# Patient Record
Sex: MALE | Race: BLACK OR AFRICAN AMERICAN | NOT HISPANIC OR LATINO
[De-identification: names, ages, dates, MRNs, and addresses within clinical notes are randomized per-mention and may not be internally consistent; named-entity substitution may affect disease eponyms.]

---

## 2018-03-09 ENCOUNTER — APPOINTMENT (OUTPATIENT)
Dept: OTOLARYNGOLOGY | Facility: CLINIC | Age: 63
End: 2018-03-09
Payer: COMMERCIAL

## 2018-03-09 DIAGNOSIS — Z78.9 OTHER SPECIFIED HEALTH STATUS: ICD-10-CM

## 2018-03-09 PROCEDURE — 99202 OFFICE O/P NEW SF 15 MIN: CPT

## 2018-03-11 PROBLEM — Z78.9 SOCIAL ALCOHOL USE: Status: ACTIVE | Noted: 2018-03-11

## 2018-03-11 RX ORDER — TIOTROPIUM BROMIDE 18 UG/1
18 CAPSULE ORAL; RESPIRATORY (INHALATION)
Refills: 0 | Status: ACTIVE | COMMUNITY
Start: 2018-03-11

## 2018-03-11 RX ORDER — ASPIRIN 81 MG/1
81 TABLET ORAL
Refills: 0 | Status: ACTIVE | COMMUNITY
Start: 2018-03-11

## 2018-03-11 RX ORDER — ALBUTEROL SULFATE 2.5 MG/3ML
(2.5 MG/3ML) SOLUTION RESPIRATORY (INHALATION)
Refills: 0 | Status: ACTIVE | COMMUNITY
Start: 2018-03-11

## 2018-03-14 ENCOUNTER — FORM ENCOUNTER (OUTPATIENT)
Age: 63
End: 2018-03-14

## 2018-03-15 ENCOUNTER — OUTPATIENT (OUTPATIENT)
Dept: OUTPATIENT SERVICES | Facility: HOSPITAL | Age: 63
LOS: 1 days | End: 2018-03-15
Payer: COMMERCIAL

## 2018-03-15 ENCOUNTER — APPOINTMENT (OUTPATIENT)
Dept: CT IMAGING | Facility: HOSPITAL | Age: 63
End: 2018-03-15
Payer: COMMERCIAL

## 2018-03-15 PROCEDURE — 70487 CT MAXILLOFACIAL W/DYE: CPT | Mod: 26

## 2018-03-15 PROCEDURE — 70487 CT MAXILLOFACIAL W/DYE: CPT

## 2018-03-16 ENCOUNTER — APPOINTMENT (OUTPATIENT)
Dept: OTOLARYNGOLOGY | Facility: CLINIC | Age: 63
End: 2018-03-16
Payer: MEDICAID

## 2018-03-16 PROCEDURE — 99213 OFFICE O/P EST LOW 20 MIN: CPT

## 2018-03-16 RX ORDER — IPRATROPIUM BROMIDE AND ALBUTEROL SULFATE 2.5; .5 MG/3ML; MG/3ML
0.5-2.5 (3) SOLUTION RESPIRATORY (INHALATION)
Refills: 0 | Status: ACTIVE | COMMUNITY

## 2018-03-16 RX ORDER — FLUTICASONE PROPIONATE AND SALMETEROL XINAFOATE 115; 21 UG/1; UG/1
115-21 AEROSOL, METERED RESPIRATORY (INHALATION)
Refills: 0 | Status: ACTIVE | COMMUNITY

## 2018-03-16 RX ORDER — TRIAMCINOLONE ACETONIDE 55 UG/1
55 SPRAY, METERED NASAL
Qty: 1 | Refills: 12 | Status: ACTIVE | COMMUNITY
Start: 2018-03-16 | End: 1900-01-01

## 2018-03-16 RX ORDER — MONTELUKAST 10 MG/1
10 TABLET, FILM COATED ORAL
Refills: 0 | Status: ACTIVE | COMMUNITY

## 2018-03-16 RX ORDER — LORATADINE AND PSEUDOEPHEDRINE SULFATE 10; 240 MG/1; MG/1
10-240 TABLET, FILM COATED, EXTENDED RELEASE ORAL
Refills: 0 | Status: ACTIVE | COMMUNITY

## 2018-03-16 RX ORDER — FLUTICASONE PROPIONATE 50 UG/1
50 SPRAY, METERED NASAL
Refills: 0 | Status: ACTIVE | COMMUNITY

## 2018-03-16 RX ORDER — FEXOFENADINE HYDROCHLORIDE 180 MG/1
180 TABLET ORAL
Refills: 0 | Status: ACTIVE | COMMUNITY

## 2018-03-16 RX ORDER — PANTOPRAZOLE 40 MG/1
40 TABLET, DELAYED RELEASE ORAL
Refills: 0 | Status: ACTIVE | COMMUNITY

## 2018-03-16 RX ORDER — METHYLPREDNISOLONE 4 MG/1
4 TABLET ORAL
Qty: 1 | Refills: 0 | Status: ACTIVE | COMMUNITY
Start: 2018-03-16 | End: 1900-01-01

## 2018-04-02 ENCOUNTER — APPOINTMENT (OUTPATIENT)
Dept: CT IMAGING | Facility: HOSPITAL | Age: 63
End: 2018-04-02

## 2018-04-09 ENCOUNTER — FORM ENCOUNTER (OUTPATIENT)
Age: 63
End: 2018-04-09

## 2018-04-10 ENCOUNTER — OUTPATIENT (OUTPATIENT)
Dept: OUTPATIENT SERVICES | Facility: HOSPITAL | Age: 63
LOS: 1 days | End: 2018-04-10
Payer: COMMERCIAL

## 2018-04-10 PROCEDURE — 70487 CT MAXILLOFACIAL W/DYE: CPT

## 2018-04-10 PROCEDURE — 70487 CT MAXILLOFACIAL W/DYE: CPT | Mod: 26

## 2018-04-16 ENCOUNTER — APPOINTMENT (OUTPATIENT)
Dept: OTOLARYNGOLOGY | Facility: CLINIC | Age: 63
End: 2018-04-16
Payer: MEDICAID

## 2018-04-16 VITALS
BODY MASS INDEX: 25.16 KG/M2 | DIASTOLIC BLOOD PRESSURE: 74 MMHG | HEIGHT: 68 IN | TEMPERATURE: 98.6 F | WEIGHT: 166 LBS | SYSTOLIC BLOOD PRESSURE: 125 MMHG | HEART RATE: 79 BPM | OXYGEN SATURATION: 96 %

## 2018-04-16 DIAGNOSIS — Z87.09 PERSONAL HISTORY OF OTHER DISEASES OF THE RESPIRATORY SYSTEM: ICD-10-CM

## 2018-04-16 DIAGNOSIS — Z87.39 PERSONAL HISTORY OF OTHER DISEASES OF THE MUSCULOSKELETAL SYSTEM AND CONNECTIVE TISSUE: ICD-10-CM

## 2018-04-16 DIAGNOSIS — I63.9 CEREBRAL INFARCTION, UNSPECIFIED: ICD-10-CM

## 2018-04-16 DIAGNOSIS — Z00.00 ENCOUNTER FOR GENERAL ADULT MEDICAL EXAMINATION W/OUT ABNORMAL FINDINGS: ICD-10-CM

## 2018-04-16 PROCEDURE — 99214 OFFICE O/P EST MOD 30 MIN: CPT | Mod: 25

## 2018-04-16 PROCEDURE — 31231 NASAL ENDOSCOPY DX: CPT

## 2018-04-17 PROBLEM — Z87.39 HISTORY OF ARTHRITIS: Status: RESOLVED | Noted: 2018-03-16 | Resolved: 2018-04-17

## 2018-04-17 PROBLEM — Z87.09 HISTORY OF ASTHMA: Status: RESOLVED | Noted: 2018-03-16 | Resolved: 2018-04-17

## 2018-04-17 PROBLEM — Z87.09 HISTORY OF BRONCHITIS: Status: RESOLVED | Noted: 2018-03-16 | Resolved: 2018-04-17

## 2018-04-17 PROBLEM — I63.9 CEREBROVASCULAR ACCIDENT (CVA): Status: ACTIVE | Noted: 2018-03-16

## 2018-04-24 ENCOUNTER — RESULT REVIEW (OUTPATIENT)
Age: 63
End: 2018-04-24

## 2018-04-24 ENCOUNTER — OUTPATIENT (OUTPATIENT)
Dept: OUTPATIENT SERVICES | Facility: HOSPITAL | Age: 63
LOS: 1 days | Discharge: HOME | End: 2018-04-24
Payer: COMMERCIAL

## 2018-04-24 ENCOUNTER — APPOINTMENT (OUTPATIENT)
Dept: OTOLARYNGOLOGY | Facility: HOSPITAL | Age: 63
End: 2018-04-24
Payer: MEDICAID

## 2018-04-24 VITALS
OXYGEN SATURATION: 98 % | HEIGHT: 68 IN | RESPIRATION RATE: 16 BRPM | SYSTOLIC BLOOD PRESSURE: 122 MMHG | TEMPERATURE: 98 F | DIASTOLIC BLOOD PRESSURE: 73 MMHG | HEART RATE: 93 BPM | WEIGHT: 159.17 LBS

## 2018-04-24 DIAGNOSIS — Z98.49 CATARACT EXTRACTION STATUS, UNSPECIFIED EYE: Chronic | ICD-10-CM

## 2018-04-24 LAB
APPEARANCE UR: CLEAR — SIGNIFICANT CHANGE UP
BILIRUB UR-MCNC: NEGATIVE — SIGNIFICANT CHANGE UP
COLOR SPEC: YELLOW — SIGNIFICANT CHANGE UP
DIFF PNL FLD: NEGATIVE — SIGNIFICANT CHANGE UP
GLUCOSE UR QL: NEGATIVE — SIGNIFICANT CHANGE UP
KETONES UR-MCNC: (no result) MG/DL
LEUKOCYTE ESTERASE UR-ACNC: NEGATIVE — SIGNIFICANT CHANGE UP
NITRITE UR-MCNC: NEGATIVE — SIGNIFICANT CHANGE UP
PH UR: 5.5 — SIGNIFICANT CHANGE UP (ref 5–8)
PROT UR-MCNC: NEGATIVE MG/DL — SIGNIFICANT CHANGE UP
SP GR SPEC: <=1.005 — SIGNIFICANT CHANGE UP (ref 1–1.03)
UROBILINOGEN FLD QL: 0.2 E.U./DL — SIGNIFICANT CHANGE UP

## 2018-04-24 PROCEDURE — 31267 ENDOSCOPY MAXILLARY SINUS: CPT | Mod: 50

## 2018-04-24 PROCEDURE — 31254 NSL/SINS NDSC W/PRTL ETHMDCT: CPT | Mod: 59,LT

## 2018-04-24 PROCEDURE — 31276 NSL/SINS NDSC FRNT TISS RMVL: CPT | Mod: 59,LT

## 2018-04-24 PROCEDURE — 31288 NASAL/SINUS ENDOSCOPY SURG: CPT | Mod: 50

## 2018-04-24 PROCEDURE — 31253 NSL/SINS NDSC TOTAL: CPT | Mod: RT

## 2018-04-24 PROCEDURE — 61782 SCAN PROC CRANIAL EXTRA: CPT

## 2018-04-24 RX ORDER — CEFAZOLIN SODIUM 1 G
1000 VIAL (EA) INJECTION EVERY 8 HOURS
Qty: 0 | Refills: 0 | Status: DISCONTINUED | OUTPATIENT
Start: 2018-04-24 | End: 2018-04-24

## 2018-04-24 RX ORDER — OXYCODONE AND ACETAMINOPHEN 5; 325 MG/1; MG/1
2 TABLET ORAL EVERY 6 HOURS
Qty: 0 | Refills: 0 | Status: DISCONTINUED | OUTPATIENT
Start: 2018-04-24 | End: 2018-04-25

## 2018-04-24 RX ORDER — TIOTROPIUM BROMIDE 18 UG/1
1 CAPSULE ORAL; RESPIRATORY (INHALATION) DAILY
Qty: 0 | Refills: 0 | Status: DISCONTINUED | OUTPATIENT
Start: 2018-04-24 | End: 2018-04-25

## 2018-04-24 RX ORDER — OXYCODONE AND ACETAMINOPHEN 5; 325 MG/1; MG/1
1 TABLET ORAL EVERY 4 HOURS
Qty: 0 | Refills: 0 | Status: DISCONTINUED | OUTPATIENT
Start: 2018-04-24 | End: 2018-04-25

## 2018-04-24 RX ORDER — ACETAMINOPHEN 500 MG
650 TABLET ORAL ONCE
Qty: 0 | Refills: 0 | Status: DISCONTINUED | OUTPATIENT
Start: 2018-04-24 | End: 2018-04-25

## 2018-04-24 RX ORDER — ONDANSETRON 8 MG/1
4 TABLET, FILM COATED ORAL EVERY 6 HOURS
Qty: 0 | Refills: 0 | Status: DISCONTINUED | OUTPATIENT
Start: 2018-04-24 | End: 2018-04-25

## 2018-04-24 RX ORDER — CEFAZOLIN SODIUM 1 G
1000 VIAL (EA) INJECTION EVERY 8 HOURS
Qty: 0 | Refills: 0 | Status: COMPLETED | OUTPATIENT
Start: 2018-04-24 | End: 2018-04-25

## 2018-04-24 RX ORDER — HYDROCODONE BITARTRATE AND ACETAMINOPHEN 7.5; 325 MG/15ML; MG/15ML
1 SOLUTION ORAL
Qty: 20 | Refills: 0
Start: 2018-04-24 | End: 2018-04-28

## 2018-04-24 RX ORDER — CEPHALEXIN 500 MG
1 CAPSULE ORAL
Qty: 28 | Refills: 0
Start: 2018-04-24 | End: 2018-04-30

## 2018-04-24 RX ORDER — ALBUTEROL 90 UG/1
2 AEROSOL, METERED ORAL EVERY 6 HOURS
Qty: 0 | Refills: 0 | Status: DISCONTINUED | OUTPATIENT
Start: 2018-04-24 | End: 2018-04-25

## 2018-04-24 RX ORDER — BUDESONIDE AND FORMOTEROL FUMARATE DIHYDRATE 160; 4.5 UG/1; UG/1
2 AEROSOL RESPIRATORY (INHALATION)
Qty: 0 | Refills: 0 | Status: DISCONTINUED | OUTPATIENT
Start: 2018-04-24 | End: 2018-04-25

## 2018-04-24 RX ORDER — SODIUM CHLORIDE 9 MG/ML
1000 INJECTION, SOLUTION INTRAVENOUS
Qty: 0 | Refills: 0 | Status: DISCONTINUED | OUTPATIENT
Start: 2018-04-24 | End: 2018-04-25

## 2018-04-24 RX ORDER — MORPHINE SULFATE 50 MG/1
4 CAPSULE, EXTENDED RELEASE ORAL
Qty: 0 | Refills: 0 | Status: DISCONTINUED | OUTPATIENT
Start: 2018-04-24 | End: 2018-04-24

## 2018-04-24 RX ORDER — PANTOPRAZOLE SODIUM 20 MG/1
40 TABLET, DELAYED RELEASE ORAL
Qty: 0 | Refills: 0 | Status: DISCONTINUED | OUTPATIENT
Start: 2018-04-24 | End: 2018-04-25

## 2018-04-24 RX ADMIN — Medication 1000 MILLIGRAM(S): at 21:35

## 2018-04-24 NOTE — ASU PATIENT PROFILE, ADULT - PSH
S/P Cholecystectomy (ICD9 V45.79) S/P cataract extraction  both eyes  S/P Cholecystectomy (ICD9 V45.79)

## 2018-04-25 VITALS
OXYGEN SATURATION: 96 % | RESPIRATION RATE: 16 BRPM | DIASTOLIC BLOOD PRESSURE: 68 MMHG | HEART RATE: 71 BPM | SYSTOLIC BLOOD PRESSURE: 107 MMHG | TEMPERATURE: 99 F

## 2018-04-25 PROCEDURE — 81003 URINALYSIS AUTO W/O SCOPE: CPT

## 2018-04-25 PROCEDURE — 31259 NSL/SINS NDSC SPHN TISS RMVL: CPT | Mod: RT

## 2018-04-25 PROCEDURE — 87075 CULTR BACTERIA EXCEPT BLOOD: CPT

## 2018-04-25 PROCEDURE — 31276 NSL/SINS NDSC FRNT TISS RMVL: CPT | Mod: 50

## 2018-04-25 PROCEDURE — 31254 NSL/SINS NDSC W/PRTL ETHMDCT: CPT | Mod: LT

## 2018-04-25 PROCEDURE — 87070 CULTURE OTHR SPECIMN AEROBIC: CPT

## 2018-04-25 PROCEDURE — C1889: CPT

## 2018-04-25 PROCEDURE — 87184 SC STD DISK METHOD PER PLATE: CPT

## 2018-04-25 PROCEDURE — 31256 EXPLORATION MAXILLARY SINUS: CPT | Mod: 50

## 2018-04-25 PROCEDURE — 61782 SCAN PROC CRANIAL EXTRA: CPT

## 2018-04-25 PROCEDURE — 88305 TISSUE EXAM BY PATHOLOGIST: CPT

## 2018-04-25 PROCEDURE — 87102 FUNGUS ISOLATION CULTURE: CPT

## 2018-04-25 PROCEDURE — 88311 DECALCIFY TISSUE: CPT

## 2018-04-25 PROCEDURE — 94640 AIRWAY INHALATION TREATMENT: CPT

## 2018-04-25 PROCEDURE — 31288 NASAL/SINUS ENDOSCOPY SURG: CPT | Mod: LT

## 2018-04-25 PROCEDURE — 87186 SC STD MICRODIL/AGAR DIL: CPT

## 2018-04-25 RX ADMIN — SODIUM CHLORIDE 75 MILLILITER(S): 9 INJECTION, SOLUTION INTRAVENOUS at 11:07

## 2018-04-25 RX ADMIN — PANTOPRAZOLE SODIUM 40 MILLIGRAM(S): 20 TABLET, DELAYED RELEASE ORAL at 05:41

## 2018-04-25 RX ADMIN — Medication 1000 MILLIGRAM(S): at 05:41

## 2018-04-25 RX ADMIN — TIOTROPIUM BROMIDE 1 CAPSULE(S): 18 CAPSULE ORAL; RESPIRATORY (INHALATION) at 11:07

## 2018-04-25 RX ADMIN — BUDESONIDE AND FORMOTEROL FUMARATE DIHYDRATE 2 PUFF(S): 160; 4.5 AEROSOL RESPIRATORY (INHALATION) at 09:03

## 2018-04-25 RX ADMIN — Medication 1000 MILLIGRAM(S): at 13:30

## 2018-04-25 RX ADMIN — ALBUTEROL 2 PUFF(S): 90 AEROSOL, METERED ORAL at 15:34

## 2018-04-25 NOTE — PROGRESS NOTE ADULT - SUBJECTIVE AND OBJECTIVE BOX
61 yo male s/p FESS for recurrent polyposis. Pt Did well overnight, was transferred to floor with merocel on each side of the nose. Complaining of mild bloody drainage from each side this AM, otherwise pain controlled, no changes in vision, no headaches  Vital Signs Last 24 Hrs  T(C): 36.4 (25 Apr 2018 08:59), Max: 37.6 (24 Apr 2018 20:31)  T(F): 97.5 (25 Apr 2018 08:59), Max: 99.6 (24 Apr 2018 20:31)  HR: 84 (25 Apr 2018 08:59) (74 - 86)  BP: 109/56 (25 Apr 2018 08:59) (101/67 - 136/81)  BP(mean): 95 (24 Apr 2018 17:55) (95 - 106)  RR: 18 (25 Apr 2018 08:59) (7 - 18)  SpO2: 96% (25 Apr 2018 08:59) (92% - 97%)    PHYSICAL EXAM:    ENT EXAM-   Constitutional: Well-developed, well-nourished.  No hoarseness.     Head:  normocephalic, atraumatic.   Nose:  merocel in place on each side, slight bloody drainage noted from each side,   OC/OP: Floor of mouth, buccal mucosa, lips, hard palate, soft palate, uvula, posterior pharyngeal wall normal.  Mucosa moist.  Neck:  Trachea midline.  Thyroid, parotid and submandibular glands normal.    61 yo male with COPD and GERD s/p bilateral FESS for recurrent nasal polyposis  -Packing to remain in place at this time due to bloody drainage  -Continue home medications  -Pain control  -Soft diet  -Skull base precautions: no nose blowing, no bending over/or heavy lifting, open mouth sneezing  -Will reassess if patient stable for discharge later in afternoon

## 2018-04-26 RX ORDER — CEPHALEXIN 500 MG
1 CAPSULE ORAL
Qty: 28 | Refills: 0
Start: 2018-04-26 | End: 2018-05-02

## 2018-04-26 RX ORDER — OXYCODONE AND ACETAMINOPHEN 5; 325 MG/1; MG/1
1 TABLET ORAL
Qty: 15 | Refills: 0
Start: 2018-04-26 | End: 2018-04-30

## 2018-04-30 ENCOUNTER — APPOINTMENT (OUTPATIENT)
Dept: OTOLARYNGOLOGY | Facility: CLINIC | Age: 63
End: 2018-04-30
Payer: MEDICAID

## 2018-04-30 VITALS
WEIGHT: 166 LBS | HEART RATE: 91 BPM | BODY MASS INDEX: 25.16 KG/M2 | SYSTOLIC BLOOD PRESSURE: 110 MMHG | DIASTOLIC BLOOD PRESSURE: 56 MMHG | HEIGHT: 68 IN | OXYGEN SATURATION: 95 %

## 2018-04-30 DIAGNOSIS — Z82.5 FAMILY HISTORY OF ASTHMA AND OTHER CHRONIC LOWER RESPIRATORY DISEASES: ICD-10-CM

## 2018-04-30 DIAGNOSIS — Z87.891 PERSONAL HISTORY OF NICOTINE DEPENDENCE: ICD-10-CM

## 2018-04-30 DIAGNOSIS — Z80.9 FAMILY HISTORY OF MALIGNANT NEOPLASM, UNSPECIFIED: ICD-10-CM

## 2018-04-30 PROCEDURE — 31237 NSL/SINS NDSC SURG BX POLYPC: CPT | Mod: 50,58

## 2018-04-30 PROCEDURE — 99024 POSTOP FOLLOW-UP VISIT: CPT

## 2018-05-01 PROBLEM — Z87.891 FORMER SMOKER: Status: ACTIVE | Noted: 2018-03-16

## 2018-05-01 PROBLEM — Z80.9 FAMILY HISTORY OF MALIGNANT NEOPLASM: Status: ACTIVE | Noted: 2018-03-16

## 2018-05-01 PROBLEM — Z82.5 FAMILY HISTORY OF ASTHMA: Status: ACTIVE | Noted: 2018-03-16

## 2018-05-07 ENCOUNTER — APPOINTMENT (OUTPATIENT)
Dept: OTOLARYNGOLOGY | Facility: CLINIC | Age: 63
End: 2018-05-07
Payer: MEDICAID

## 2018-05-07 VITALS
SYSTOLIC BLOOD PRESSURE: 113 MMHG | WEIGHT: 166 LBS | TEMPERATURE: 98.6 F | BODY MASS INDEX: 25.16 KG/M2 | DIASTOLIC BLOOD PRESSURE: 78 MMHG | OXYGEN SATURATION: 96 % | HEIGHT: 68 IN | HEART RATE: 76 BPM

## 2018-05-07 PROCEDURE — 31237 NSL/SINS NDSC SURG BX POLYPC: CPT | Mod: 50,58

## 2018-05-07 PROCEDURE — 99024 POSTOP FOLLOW-UP VISIT: CPT

## 2018-05-09 ENCOUNTER — LABORATORY RESULT (OUTPATIENT)
Age: 63
End: 2018-05-09

## 2018-05-14 ENCOUNTER — APPOINTMENT (OUTPATIENT)
Age: 63
End: 2018-05-14
Payer: MEDICAID

## 2018-05-14 VITALS
HEIGHT: 68 IN | WEIGHT: 166 LBS | BODY MASS INDEX: 25.16 KG/M2 | HEART RATE: 68 BPM | OXYGEN SATURATION: 96 % | DIASTOLIC BLOOD PRESSURE: 75 MMHG | SYSTOLIC BLOOD PRESSURE: 125 MMHG

## 2018-05-14 PROCEDURE — 99024 POSTOP FOLLOW-UP VISIT: CPT

## 2018-05-14 PROCEDURE — 31237 NSL/SINS NDSC SURG BX POLYPC: CPT | Mod: 50,58

## 2018-05-14 RX ORDER — AMOXICILLIN AND CLAVULANATE POTASSIUM 875; 125 MG/1; MG/1
875-125 TABLET, COATED ORAL
Qty: 20 | Refills: 0 | Status: DISCONTINUED | COMMUNITY
Start: 2018-03-16 | End: 2018-05-14

## 2018-06-11 ENCOUNTER — APPOINTMENT (OUTPATIENT)
Dept: OTOLARYNGOLOGY | Facility: CLINIC | Age: 63
End: 2018-06-11
Payer: MEDICAID

## 2018-06-11 VITALS
SYSTOLIC BLOOD PRESSURE: 120 MMHG | DIASTOLIC BLOOD PRESSURE: 72 MMHG | HEART RATE: 73 BPM | WEIGHT: 170 LBS | BODY MASS INDEX: 25.76 KG/M2 | TEMPERATURE: 98.5 F | HEIGHT: 68 IN | OXYGEN SATURATION: 95 %

## 2018-06-11 PROCEDURE — 99024 POSTOP FOLLOW-UP VISIT: CPT

## 2018-06-11 PROCEDURE — 31237 NSL/SINS NDSC SURG BX POLYPC: CPT | Mod: 50,58

## 2018-07-16 ENCOUNTER — APPOINTMENT (OUTPATIENT)
Dept: OTOLARYNGOLOGY | Facility: CLINIC | Age: 63
End: 2018-07-16

## 2018-07-20 PROBLEM — K21.9 GASTRO-ESOPHAGEAL REFLUX DISEASE WITHOUT ESOPHAGITIS: Chronic | Status: ACTIVE | Noted: 2018-04-24

## 2018-07-20 PROBLEM — J33.9 NASAL POLYP, UNSPECIFIED: Chronic | Status: ACTIVE | Noted: 2018-04-24

## 2018-07-20 PROBLEM — K52.81 EOSINOPHILIC GASTRITIS OR GASTROENTERITIS: Chronic | Status: ACTIVE | Noted: 2018-04-24

## 2018-07-20 PROBLEM — M19.049 PRIMARY OSTEOARTHRITIS, UNSPECIFIED HAND: Chronic | Status: ACTIVE | Noted: 2018-04-24

## 2018-07-20 PROBLEM — J32.9 CHRONIC SINUSITIS, UNSPECIFIED: Chronic | Status: ACTIVE | Noted: 2018-04-24

## 2018-07-23 ENCOUNTER — APPOINTMENT (OUTPATIENT)
Dept: OTOLARYNGOLOGY | Facility: CLINIC | Age: 63
End: 2018-07-23
Payer: MEDICAID

## 2018-07-23 VITALS
HEIGHT: 68 IN | TEMPERATURE: 98.3 F | DIASTOLIC BLOOD PRESSURE: 76 MMHG | WEIGHT: 160 LBS | BODY MASS INDEX: 24.25 KG/M2 | SYSTOLIC BLOOD PRESSURE: 114 MMHG | HEART RATE: 83 BPM | OXYGEN SATURATION: 95 %

## 2018-07-23 PROCEDURE — 31237 NSL/SINS NDSC SURG BX POLYPC: CPT | Mod: 50

## 2018-07-23 PROCEDURE — 99214 OFFICE O/P EST MOD 30 MIN: CPT | Mod: 25

## 2019-02-11 ENCOUNTER — APPOINTMENT (OUTPATIENT)
Dept: OTOLARYNGOLOGY | Facility: CLINIC | Age: 64
End: 2019-02-11

## 2019-03-06 ENCOUNTER — FORM ENCOUNTER (OUTPATIENT)
Age: 64
End: 2019-03-06

## 2019-03-07 ENCOUNTER — APPOINTMENT (OUTPATIENT)
Dept: MRI IMAGING | Facility: HOSPITAL | Age: 64
End: 2019-03-07

## 2019-03-07 ENCOUNTER — APPOINTMENT (OUTPATIENT)
Dept: HEART AND VASCULAR | Facility: CLINIC | Age: 64
End: 2019-03-07
Payer: MEDICAID

## 2019-03-07 ENCOUNTER — OUTPATIENT (OUTPATIENT)
Dept: OUTPATIENT SERVICES | Facility: HOSPITAL | Age: 64
LOS: 1 days | End: 2019-03-07
Payer: MEDICAID

## 2019-03-07 DIAGNOSIS — Z98.49 CATARACT EXTRACTION STATUS, UNSPECIFIED EYE: Chronic | ICD-10-CM

## 2019-03-07 PROCEDURE — 93282 PRGRMG EVAL IMPLANTABLE DFB: CPT

## 2019-03-07 PROCEDURE — A9585: CPT

## 2019-03-07 PROCEDURE — 70543 MRI ORBT/FAC/NCK W/O &W/DYE: CPT

## 2019-03-11 NOTE — REVIEW OF SYSTEMS
[Sinus Pressure] : sinus pressure [Negative] : Integumentary [Fever] : no fever [Chills] : no chills [Feeling Fatigued] : not feeling fatigued

## 2019-03-11 NOTE — PROCEDURE
[de-identified] : [place] scientific single chamber ICD that is MRI compatible (both device and wire)\par he is not pacemaker dependant\par 1% pacing\par VVI 40\par RV sense, RV threshold and RV impedance and shock impedance stable before and after MRI\par Post MRI he was set back to normal settings / ICD therapy back on

## 2019-03-11 NOTE — PHYSICAL EXAM
[General Appearance - Well Developed] : well developed [Normal Appearance] : normal appearance [Well Groomed] : well groomed [General Appearance - Well Nourished] : well nourished [No Deformities] : no deformities [General Appearance - In No Acute Distress] : no acute distress [Heart Rate And Rhythm] : heart rate and rhythm were normal [Heart Sounds] : normal S1 and S2 [] : no respiratory distress [Respiration, Rhythm And Depth] : normal respiratory rhythm and effort [Exaggerated Use Of Accessory Muscles For Inspiration] : no accessory muscle use [Left Infraclavicular] : left infraclavicular area [Clean] : clean [Dry] : dry [Well-Healed] : well-healed [Palpable Crepitus] : no palpable crepitus [Bleeding] : no active bleeding [Foul Odor] : no foul smell [Purulent Drainage] : no purulent drainage [Serosanguineous Drainage] : no serosanquineous drainage [Serous Drainage] : no serous drainage [Erythema] : not erythematous [Warm] : not warm [Tender] : not tender [Indurated] : not indurated [Fluctuant] : not fluctuant

## 2019-03-11 NOTE — HISTORY OF PRESENT ILLNESS
[Palpitations] : no palpitations [SOB] : no dyspnea [Syncope] : no syncope [Dizziness] : no dizziness [Chest Pain] : no chest pain or discomfort [ICD Shocks] : no recent ICD shocks [Shoulder Pain] : no shoulder pain [Pain at Site] : no pain at device site [Erythema at Site] : no erythema at device site [Swelling at Site] : no swelling at device site [de-identified] : He presents for ICD check and reprogramming before and after MRI.  HIs device was placed in January with Dr. Weldon; who he follows up with.  Interrogation states for VT.  He denies any palpitation, syncope, chest pain, SOB, orthopnea, PND.  For MRI of his sinuses.

## 2019-03-11 NOTE — DISCUSSION/SUMMARY
[FreeTextEntry1] : ICD interrogation reveals normal function.  All measured data is within normal limits both before and after MRI.  No events for review.  Therapy turned back on post MRI.  HE will follow up with his primary electrophysiologist for routine device care.  He knows to call with any questions or concerns.

## 2019-04-15 ENCOUNTER — APPOINTMENT (OUTPATIENT)
Dept: OTOLARYNGOLOGY | Facility: CLINIC | Age: 64
End: 2019-04-15

## 2019-04-15 DIAGNOSIS — D32.9 BENIGN NEOPLASM OF MENINGES, UNSPECIFIED: ICD-10-CM

## 2019-04-15 NOTE — REVIEW OF SYSTEMS
[As Noted in HPI] : as noted in HPI [Sense Of Smell Problem] : sense of smell problem [Negative] : Heme/Lymph

## 2019-04-15 NOTE — DATA REVIEWED
[de-identified] : MRI 3/7/19: no interval change in size or configuration of R frontal en plaque meningioma compared w MRI 2017\par  [No studies available for review at this time] : No studies available for review at this time

## 2019-04-15 NOTE — ASSESSMENT
[FreeTextEntry1] : 61 yo M with small frontal meningioma and recurrent sinusitis with nasal polyposis now s/p revision ESS (Dr Stokes 04/24/2018). Debridement today with improved exam.

## 2019-04-15 NOTE — PHYSICAL EXAM
[Nasal Endoscopy Performed] : nasal endoscopy was performed, see procedure section for findings [de-identified] : 2+ radial pulse bilaterally [Normal] : no rashes

## 2019-04-15 NOTE — HISTORY OF PRESENT ILLNESS
[de-identified] : 61 yo M with small frontal meningioma and recurrent sinusitis with nasal polyposis (prior surgery 2010 with 1 year benefit) now s/p revision ESS (Dr Stokes 04/24/2018). He continues to do well. He denies bleeding, vision changes, rhinorrhea, headaches or salty taste. He has been using nasal saline and nasacort spray. Of note, patient c/o severe GI reflux symptoms not fully controlled by his current medication. \par \par Here for MRI review and f/u [FreeTextEntry1] : MRI 3/7/19: no interval change in size or configuration of R frontal en plaque meningioma compared w MRI 2017\par

## 2019-04-15 NOTE — PROCEDURE
[Topical Lidocaine] : topical lidocaine [Recalcitrant Symptoms] : recalcitrant symptoms  [Rigid Endoscope] : examined with a rigid endoscope [Oxymetazoline HCl] : oxymetazoline HCl

## 2019-04-29 ENCOUNTER — APPOINTMENT (OUTPATIENT)
Dept: OTOLARYNGOLOGY | Facility: CLINIC | Age: 64
End: 2019-04-29

## 2019-06-03 ENCOUNTER — APPOINTMENT (OUTPATIENT)
Dept: OTOLARYNGOLOGY | Facility: CLINIC | Age: 64
End: 2019-06-03
Payer: MEDICAID

## 2019-06-03 ENCOUNTER — APPOINTMENT (OUTPATIENT)
Dept: CT IMAGING | Facility: HOSPITAL | Age: 64
End: 2019-06-03

## 2019-06-03 ENCOUNTER — OTHER (OUTPATIENT)
Age: 64
End: 2019-06-03

## 2019-06-03 VITALS — SYSTOLIC BLOOD PRESSURE: 119 MMHG | TEMPERATURE: 98 F | HEART RATE: 73 BPM | DIASTOLIC BLOOD PRESSURE: 74 MMHG

## 2019-06-03 PROCEDURE — 99213 OFFICE O/P EST LOW 20 MIN: CPT

## 2019-06-03 NOTE — PHYSICAL EXAM
[Nasal Endoscopy Performed] : nasal endoscopy was performed, see procedure section for findings [Normal] : no rashes [de-identified] : 2+ radial pulse bilaterally

## 2019-06-03 NOTE — HISTORY OF PRESENT ILLNESS
[de-identified] : 61 yo M with small frontal meningioma and recurrent sinusitis with nasal polyposis (prior surgery 2010 with 1 year benefit) now s/p revision ESS (Dr Stokes 04/24/2018). He continues to do well. He denies bleeding, vision changes, rhinorrhea, headaches or salty taste. He has been using nasal saline and nasacort spray. Of note, patient c/o severe GI reflux symptoms not fully controlled by his current medication.  [FreeTextEntry1] : - repeat CT sinus needed, will send patient for that today. to f/u with patient when scan results are out WRT sinus problems

## 2019-06-03 NOTE — ASSESSMENT
[FreeTextEntry1] : 63 yo M with small frontal meningioma and recurrent sinusitis with nasal polyposis now s/p revision ESS (Dr Stokes 04/24/2018). Debridement today with improved exam. \par \par PLAN:\par - repeat CT sinus today and f/u results with patient\par

## 2019-06-03 NOTE — REVIEW OF SYSTEMS
[Sense Of Smell Problem] : sense of smell problem [As Noted in HPI] : as noted in HPI [Negative] : Heme/Lymph

## 2019-07-22 ENCOUNTER — APPOINTMENT (OUTPATIENT)
Dept: OTOLARYNGOLOGY | Facility: CLINIC | Age: 64
End: 2019-07-22
Payer: MEDICAID

## 2019-07-22 VITALS
OXYGEN SATURATION: 95 % | HEIGHT: 68 IN | HEART RATE: 76 BPM | BODY MASS INDEX: 24.86 KG/M2 | DIASTOLIC BLOOD PRESSURE: 78 MMHG | SYSTOLIC BLOOD PRESSURE: 117 MMHG | WEIGHT: 164 LBS

## 2019-07-22 VITALS
OXYGEN SATURATION: 96 % | WEIGHT: 164 LBS | BODY MASS INDEX: 24.86 KG/M2 | DIASTOLIC BLOOD PRESSURE: 78 MMHG | HEIGHT: 68 IN | HEART RATE: 76 BPM | SYSTOLIC BLOOD PRESSURE: 117 MMHG

## 2019-07-22 DIAGNOSIS — J33.9 NASAL POLYP, UNSPECIFIED: ICD-10-CM

## 2019-07-22 DIAGNOSIS — J32.9 CHRONIC SINUSITIS, UNSPECIFIED: ICD-10-CM

## 2019-07-22 PROCEDURE — 99213 OFFICE O/P EST LOW 20 MIN: CPT | Mod: 25

## 2019-07-22 PROCEDURE — 31231 NASAL ENDOSCOPY DX: CPT

## 2019-07-22 RX ORDER — NON-ADHERENT BANDAGE 3"X4"
0.65 BANDAGE TOPICAL TWICE DAILY
Qty: 1 | Refills: 0 | Status: ACTIVE | COMMUNITY
Start: 2019-07-22 | End: 1900-01-01

## 2019-07-22 RX ORDER — TRIAMCINOLONE ACETONIDE 55 UG/1
55 SPRAY, METERED NASAL
Qty: 1 | Refills: 2 | Status: ACTIVE | COMMUNITY
Start: 2019-07-22 | End: 1900-01-01

## 2019-07-22 NOTE — PHYSICAL EXAM
[Nasal Endoscopy Performed] : nasal endoscopy was performed, see procedure section for findings [] : septum deviated to the left [Normal] : no abnormal secretions

## 2019-07-22 NOTE — ASSESSMENT
[FreeTextEntry1] : 62F w/ PMH frontal meningioma and recurrent sinusitis w/ nasal polyposis s/p FESS 2010 c/b recurrence of sinusitis s/p revision FESS by Kike 04/2018, with CT showing continued chronic sinusitis likeely due to persistent polyps.\par \par Plan:\par - recommend saline and nasacort sprays twice daily\par - recommend allergy testing by previous allergist at [facility]\par - will f/u allergy testing results\par - f/u in 3 mo

## 2019-07-22 NOTE — REVIEW OF SYSTEMS
[As Noted in HPI] : as noted in HPI [Nasal Congestion] : nasal congestion [Sense Of Smell Problem] : sense of smell problem [Negative] : Endocrine

## 2019-07-22 NOTE — HISTORY OF PRESENT ILLNESS
[de-identified] : 62F w/ PMH frontal meningioma and recurrent sinusitis w/ nasal polyposis s/p FESS 2010 c/b recurrence of sinusitis s/p revision FESS by Kike 04/2018.  [FreeTextEntry1] : CT 7/05/19 shows chronic sinusitis in Maxillary and L sphenoid. Patient c/o continued anosmia and congestion.

## 2020-02-06 NOTE — ASU PATIENT PROFILE, ADULT - NS PRO PT RIGHT SUPPORT PERSON
DATE OF OPERATION:  02/06/20 - [facility]

 

DATE OF BIRTH:  [birth year]

 

SURGEON:  Rajiv Marroquin MD

 

ASSISTANT:  CUATE Dennis.  An assistant was needed for the procedure to aid 
in positioning of the arm and retraction.

 

ANESTHESIOLOGIST:  Dr. Schmidt.

 

ANESTHESIA:  General.

 

PRE-OP DIAGNOSES:

1.  Left thumb carpometacarpal degenerative joint disease, stage III.

2.  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 
collateral ligament insufficiency.

3.  Left index finger retained foreign body.

 

POST-OP DIAGNOSES:

1.  Left thumb carpometacarpal degenerative joint disease, stage III.

2.  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 
collateral ligament insufficiency.

3.  Left index finger retained foreign body.

 

OPERATIVE PROCEDURES:

1.  Left thumb carpometacarpal arthroplasty with trapeziectomy.

2.  Distally based split flexor carpi radialis tendon transfer for thumb 
suspension and tendon interposition.

3.  Left thumb metacarpophalangeal joint arthrodesis with autogenous distal 
radius bone graft.

4.  Removal of foreign body, left index finger.

 

INDICATIONS:  Fredi has the aforementioned arthritis, is causing quite a bit 
of pain.  He understands the risks and benefits associated with surgery.  He 
wished to proceed.  The foreign body on left index finger is right in the area 
where he had had a traumatic wound and it was stitched up.  He may have a 
retained suture under the skin.  The one area is quite symptomatic and so I 
told him we excise that.

 

ESTIMATED BLOOD LOSS:  2 mL.

 

COMPLICATIONS:  None.

 

FINDINGS:  See above and below.

 

DESCRIPTION OF PROCEDURE:  Mr. Arevalo was seen in the preoperative holding 
area. The correct site, side and procedures were identified.  We came back to 
the operating room.  The arm was prepped and draped in the usual fashion and a 
time-out was performed.

 

The arm was exsanguinated with the Esmarch and the tourniquet was inflated to 
225 mmHg.  I first made a 2- to 3-cm incision over the dorsoradial thumb base. 
Dissection was carried down through the subcutaneous tissue longitudinally to 
preserve the sensory nerves.  The radial artery was mobilized and retracted out 
of the way.  Subperiosteal and capsular flaps were raised to release the soft 
tissue out about the periphery of the trapezium.  The trapezium was then 
excised in its entirety in piecemeal fashion preserving the FCR tendon in the 
base.  Once I had a full trapeziectomy performed, I went ahead and examined the 
scaphotrapezoid joint, that looked fine.  I then went ahead and made a bone 
tunnel from the dorsoradial thumb base, exiting out the volar ulnar articular 
surface near the insertion of the FCR tendon on the base of the second 
metacarpal.  The wound was then irrigated out and attention was turned to the 
tendon transfer.

 

I made a 1 cm transverse incision over the distal FCR tendon. Dissection was 
carried down and the sheath was released and made 2 additional incisions over 
the FCR tendon, each about 7 or 8 cm proximal to the left. The sheath was 
released along its entirety.  The tendon was brought up out of the wound 
distally and split longitudinally with a #15 blade.  A 26-gauge wire was passed 
into the tendon split. The wire was then pulled up into the most proximal wound 
under the skin via a Snehal clamp, releasing half the tendon at the 
musculotendinous junction.  The free tail of the tendon was then shuttled down 
into the thumb base wound with two 26-gauge wires.  The tendon split was taken 
all the way down to the base of the second metacarpal.  The free tail of the 
tendon was taken out through the bone tunnel back around the intact limb and 
then maximum tension was set as the tendon transfer was secured with 3 figure-of
-eight 3-0 Ethibond sutures, the first sewing all 3 limbs of the tendon 
transfer together, the last 2 sewing intact limb to intact limb.  The remainder 
of the free tendon tail was rolled up as a ball and secured with a 3-0 Ethibond 
suture and docked as an interposition proximal to the base of the metacarpal.  
The wound was then irrigated out.  The capsule was closed with 4-0 Vicryl 
suture.  All the wounds were closed with 4-0 nylon suture.

 

We then turned our attention to the fusion.  I made a longitudinal incision 
over the dorsum of the MCP joint.  Dissection was carried down, full-thickness 
flaps were raised off the extensor tendon.  The tendon was split longitudinally 
between the EPB and the EPL and the tendons were taken down on to the dorsum of 
the finger. The capsule was opened and the collateral ligaments were released.  
I was unable to open up the joint.  I used the Acumed MCP joint fusion reamers 
to prepare the end of the metacarpal and the proximal phalanx.  Once I had nice 
opposing surfaces, I went ahead and placed the guidewire from my standard 
Acutrak screw.  This was placed in the appropriate location and confirmed on 
fluoroscopy.  Once I was pleased with this, I wanted to pack a little bone 
graft and so I went ahead and made a 1- to 2-cm incision over the dorsum of the 
distal radius just proximal to Sherrie's tubercle.  Subperiosteal dissection 
exposed the dorsum of the radius. Small cortical window was made with the 
osteotomes.  I harvested fair amount of cancellous distal radius bone graft.  
That was then directly packed into the MCP joint fusion site.  I then 
compressed as I then drilled over my wire and then placed a 30 mm standard 
Acutrak screw, this generated excellent compression and stability.

 

At this point, everything was looking good.  There was excellent compression. 
Final fluoroscopic imaging showed good alignment and good compression across 
the fusion site.  I irrigated out the bone graft harvest site and the dorsum of 
the wound.  The bone graft harvest site skin was closed with 4-0 nylon suture.  
The capsule was closed over the MCP joint with 4-0 PDS.  The extensor tendon 
was then repaired longitudinally with 4-0 PDS.  The skin was closed with 4-0 
nylon suture.

 

Lastly, I ellipsed out the area where he had the likely foreign body on the 
radial aspect of the left index finger near the MCP joint.  I dissected down 
and took all the skin plus all the scar tissue surrounded that until healthy 
tissue was on all sites.  I then irrigated out that wound and closed the skin 
with 4-0 nylon suture.

 

At this point, everything was looking good, wounds were dressed, thumb spica 
splint just near the end of the tip of the thumb was applied.  Tourniquet was 
deflated. The thumb pinked up immediately as to the hand.  He was taken to the 
recovery room in stable condition.

 

 [id number]/[facility] #: [id number]

MATTHEW
Declines

## 2022-06-27 ENCOUNTER — EMERGENCY (EMERGENCY)
Facility: HOSPITAL | Age: 67
LOS: 0 days | Discharge: HOME | End: 2022-06-27
Attending: STUDENT IN AN ORGANIZED HEALTH CARE EDUCATION/TRAINING PROGRAM

## 2022-06-27 VITALS
RESPIRATION RATE: 22 BRPM | DIASTOLIC BLOOD PRESSURE: 65 MMHG | SYSTOLIC BLOOD PRESSURE: 141 MMHG | HEART RATE: 91 BPM | TEMPERATURE: 98 F | HEIGHT: 68 IN | OXYGEN SATURATION: 95 % | WEIGHT: 173.94 LBS

## 2022-06-27 VITALS
OXYGEN SATURATION: 95 % | SYSTOLIC BLOOD PRESSURE: 115 MMHG | DIASTOLIC BLOOD PRESSURE: 70 MMHG | TEMPERATURE: 98 F | RESPIRATION RATE: 16 BRPM | HEART RATE: 66 BPM

## 2022-06-27 DIAGNOSIS — R05.9 COUGH, UNSPECIFIED: ICD-10-CM

## 2022-06-27 DIAGNOSIS — Z20.822 CONTACT WITH AND (SUSPECTED) EXPOSURE TO COVID-19: ICD-10-CM

## 2022-06-27 DIAGNOSIS — J33.9 NASAL POLYP, UNSPECIFIED: ICD-10-CM

## 2022-06-27 DIAGNOSIS — R06.02 SHORTNESS OF BREATH: ICD-10-CM

## 2022-06-27 DIAGNOSIS — Z98.49 CATARACT EXTRACTION STATUS, UNSPECIFIED EYE: Chronic | ICD-10-CM

## 2022-06-27 DIAGNOSIS — J44.1 CHRONIC OBSTRUCTIVE PULMONARY DISEASE WITH (ACUTE) EXACERBATION: ICD-10-CM

## 2022-06-27 DIAGNOSIS — Z95.5 PRESENCE OF CORONARY ANGIOPLASTY IMPLANT AND GRAFT: ICD-10-CM

## 2022-06-27 DIAGNOSIS — M13.839: ICD-10-CM

## 2022-06-27 DIAGNOSIS — J32.9 CHRONIC SINUSITIS, UNSPECIFIED: ICD-10-CM

## 2022-06-27 DIAGNOSIS — R06.2 WHEEZING: ICD-10-CM

## 2022-06-27 LAB
ALBUMIN SERPL ELPH-MCNC: 3.3 G/DL — SIGNIFICANT CHANGE UP (ref 3.3–5)
ALP SERPL-CCNC: 67 U/L — SIGNIFICANT CHANGE UP (ref 40–120)
ALT FLD-CCNC: 36 U/L — SIGNIFICANT CHANGE UP (ref 12–78)
ANION GAP SERPL CALC-SCNC: 4 MMOL/L — LOW (ref 5–17)
AST SERPL-CCNC: 29 U/L — SIGNIFICANT CHANGE UP (ref 15–37)
BASOPHILS # BLD AUTO: 0.03 K/UL — SIGNIFICANT CHANGE UP (ref 0–0.2)
BASOPHILS NFR BLD AUTO: 0.4 % — SIGNIFICANT CHANGE UP (ref 0–2)
BILIRUB SERPL-MCNC: 0.6 MG/DL — SIGNIFICANT CHANGE UP (ref 0.2–1.2)
BUN SERPL-MCNC: 12 MG/DL — SIGNIFICANT CHANGE UP (ref 7–23)
CALCIUM SERPL-MCNC: 8.4 MG/DL — LOW (ref 8.5–10.1)
CHLORIDE SERPL-SCNC: 108 MMOL/L — SIGNIFICANT CHANGE UP (ref 96–108)
CO2 SERPL-SCNC: 27 MMOL/L — SIGNIFICANT CHANGE UP (ref 22–31)
CREAT SERPL-MCNC: 0.9 MG/DL — SIGNIFICANT CHANGE UP (ref 0.5–1.3)
EGFR: 92 ML/MIN/1.73M2 — SIGNIFICANT CHANGE UP
EOSINOPHIL # BLD AUTO: 0 K/UL — SIGNIFICANT CHANGE UP (ref 0–0.5)
EOSINOPHIL NFR BLD AUTO: 0 % — SIGNIFICANT CHANGE UP (ref 0–6)
GLUCOSE SERPL-MCNC: 105 MG/DL — HIGH (ref 70–99)
HCT VFR BLD CALC: 40.7 % — SIGNIFICANT CHANGE UP (ref 39–50)
HGB BLD-MCNC: 13.7 G/DL — SIGNIFICANT CHANGE UP (ref 13–17)
HMPV RNA SPEC QL NAA+PROBE: DETECTED
IMM GRANULOCYTES NFR BLD AUTO: 0.4 % — SIGNIFICANT CHANGE UP (ref 0–1.5)
LYMPHOCYTES # BLD AUTO: 1.51 K/UL — SIGNIFICANT CHANGE UP (ref 1–3.3)
LYMPHOCYTES # BLD AUTO: 19 % — SIGNIFICANT CHANGE UP (ref 13–44)
MCHC RBC-ENTMCNC: 31.6 PG — SIGNIFICANT CHANGE UP (ref 27–34)
MCHC RBC-ENTMCNC: 33.7 G/DL — SIGNIFICANT CHANGE UP (ref 32–36)
MCV RBC AUTO: 93.8 FL — SIGNIFICANT CHANGE UP (ref 80–100)
MONOCYTES # BLD AUTO: 0.88 K/UL — SIGNIFICANT CHANGE UP (ref 0–0.9)
MONOCYTES NFR BLD AUTO: 11.1 % — SIGNIFICANT CHANGE UP (ref 2–14)
NEUTROPHILS # BLD AUTO: 5.5 K/UL — SIGNIFICANT CHANGE UP (ref 1.8–7.4)
NEUTROPHILS NFR BLD AUTO: 69.1 % — SIGNIFICANT CHANGE UP (ref 43–77)
NRBC # BLD: 0 /100 WBCS — SIGNIFICANT CHANGE UP (ref 0–0)
PLATELET # BLD AUTO: 146 K/UL — LOW (ref 150–400)
POTASSIUM SERPL-MCNC: 4 MMOL/L — SIGNIFICANT CHANGE UP (ref 3.5–5.3)
POTASSIUM SERPL-SCNC: 4 MMOL/L — SIGNIFICANT CHANGE UP (ref 3.5–5.3)
PROT SERPL-MCNC: 7.2 GM/DL — SIGNIFICANT CHANGE UP (ref 6–8.3)
RAPID RVP RESULT: DETECTED
RBC # BLD: 4.34 M/UL — SIGNIFICANT CHANGE UP (ref 4.2–5.8)
RBC # FLD: 13.4 % — SIGNIFICANT CHANGE UP (ref 10.3–14.5)
SARS-COV-2 RNA SPEC QL NAA+PROBE: SIGNIFICANT CHANGE UP
SODIUM SERPL-SCNC: 139 MMOL/L — SIGNIFICANT CHANGE UP (ref 135–145)
TROPONIN I, HIGH SENSITIVITY RESULT: 8.2 NG/L — SIGNIFICANT CHANGE UP
WBC # BLD: 7.95 K/UL — SIGNIFICANT CHANGE UP (ref 3.8–10.5)
WBC # FLD AUTO: 7.95 K/UL — SIGNIFICANT CHANGE UP (ref 3.8–10.5)

## 2022-06-27 PROCEDURE — 99285 EMERGENCY DEPT VISIT HI MDM: CPT

## 2022-06-27 PROCEDURE — 71045 X-RAY EXAM CHEST 1 VIEW: CPT | Mod: 26

## 2022-06-27 PROCEDURE — 93010 ELECTROCARDIOGRAM REPORT: CPT

## 2022-06-27 RX ORDER — ACETAMINOPHEN 500 MG
650 TABLET ORAL ONCE
Refills: 0 | Status: COMPLETED | OUTPATIENT
Start: 2022-06-27 | End: 2022-06-27

## 2022-06-27 RX ORDER — DEXAMETHASONE 0.5 MG/5ML
10 ELIXIR ORAL ONCE
Refills: 0 | Status: COMPLETED | OUTPATIENT
Start: 2022-06-27 | End: 2022-06-27

## 2022-06-27 RX ORDER — IPRATROPIUM/ALBUTEROL SULFATE 18-103MCG
3 AEROSOL WITH ADAPTER (GRAM) INHALATION
Refills: 0 | Status: DISCONTINUED | OUTPATIENT
Start: 2022-06-27 | End: 2022-06-27

## 2022-06-27 RX ADMIN — Medication 3 MILLILITER(S): at 09:44

## 2022-06-27 RX ADMIN — Medication 650 MILLIGRAM(S): at 13:00

## 2022-06-27 RX ADMIN — Medication 102 MILLIGRAM(S): at 10:29

## 2022-06-27 NOTE — ED ADULT NURSE NOTE - NSIMPLEMENTINTERV_GEN_ALL_ED
Implemented All Fall Risk Interventions:  [place] to call system. Call bell, personal items and telephone within reach. Instruct patient to call for assistance. Room bathroom lighting operational. Non-slip footwear when patient is off stretcher. Physically safe environment: no spills, clutter or unnecessary equipment. Stretcher in lowest position, wheels locked, appropriate side rails in place. Provide visual cue, wrist band, yellow gown, etc. Monitor gait and stability. Monitor for mental status changes and reorient to person, place, and time. Review medications for side effects contributing to fall risk. Reinforce activity limits and safety measures with patient and family.

## 2022-06-27 NOTE — ED PROVIDER NOTE - CLINICAL SUMMARY MEDICAL DECISION MAKING FREE TEXT BOX
Pt is a 66 y/o male with significant PMH of COPD, asthma, STEMI (2019, s/p 2 PCI), presenting to the ED c/o shortness of breath x 2 weeks that was exacerbated this AM upon waking, normal regimen on medications did not resolve symptoms. PE shows pt in obvious respiratory distress with wheezing. Pt quickly responded to duonebs and inhaled corticosteroids. Given clinical presentation and pt response to medications, probable acute on chronic COPD exacerbation vs. asthma exacerbation.

## 2022-06-27 NOTE — ED ADULT TRIAGE NOTE - CHIEF COMPLAINT QUOTE
Patient BIBA: Patient reports "difficultly breathing since last Monday. I called 911 because the power went out and I couldn't do my nebs." Respirations shallow, mildly labored. Received  Med/neb PTA. Patient history of COPD, Emphysema, Asthma, Last intubated 3 years ago.

## 2022-06-27 NOTE — ED ADULT NURSE NOTE - OBJECTIVE STATEMENT
*RN break coverage* pt 66 y/o BIBA c/c of SOB onset 2 weeks ago. pt states "I been treating at home but not helping". pt denies smoking. hx of COPD, asthma. denies oxygen use at home. diminished lung sounds. pt receiving neb treatment. safety maintained.

## 2022-06-27 NOTE — ED PROVIDER NOTE - PATIENT PORTAL LINK FT
You can access the FollowMyHealth Patient Portal offered by [facility] by registering at the following website: [email or web address]. By joining [employer]’s FollowMyHealth portal, you will also be able to view your health information using other applications (apps) compatible with our system.

## 2022-06-27 NOTE — ED PROVIDER NOTE - OBJECTIVE STATEMENT
Pt is a 68 y/o male with significant PMH of COPD, asthma, STEMI (2019, s/p 2 PCI), presenting to the ED c/o shortness of breath. Admits to productive white mucus cough and shortness of breath x 2 weeks. States that this morning, he woke up with worsening shortness of breath, took his typical regimen of medications without relief. Describes cough this morning as wheezing. States last hospitalized for COPD exacerbation as 2 years ago, states potentially related to seasonal allergies. Admits to R-sided chest pain this morning, spontaneous onset, denies radiation to L side, states provoking factor as moving R arm.

## 2022-06-27 NOTE — ED ADULT NURSE NOTE - NSICDXPASTMEDICALHX_GEN_ALL_CORE_FT
PAST MEDICAL HISTORY:  Asthma (ICD9 493.90)     Chronic sinusitis     CMC arthritis     Emphysema (ICD9 492.8) COPD    Eosinophilic gastritis     Esophageal reflux     Nasal polyps

## 2022-06-27 NOTE — ED PROVIDER NOTE - NS ED ROS FT
Constitutional: See HPI.  Eyes: No visual changes, eye pain or discharge. No Photophobia  ENMT: No hearing changes, pain, discharge or infections. No neck pain or stiffness. No limited ROM  Cardiac: +SOB. No edema. No chest pain with exertion.  Respiratory: +cough. No respiratory distress. No hemoptysis. No history of asthma or RAD.  GI: No nausea, vomiting, diarrhea or abdominal pain.  : No dysuria, frequency or burning. No Discharge  MS: No myalgia, muscle weakness, joint pain or back pain.  Neuro: No headache or weakness. No LOC.  Skin: No skin rash.  Except as documented in the HPI, all other systems are negative.

## 2022-06-27 NOTE — ED PROVIDER NOTE - PHYSICAL EXAMINATION
VITAL SIGNS: I have reviewed nursing notes and confirm.  CONSTITUTIONAL: well-appearing, non-toxic, NAD  SKIN: Warm dry, normal skin turgor  HEAD: NCAT  EYES: EOMI, PERRLA, no scleral icterus  ENT: Moist mucous membranes, normal pharynx with no erythema or exudates  NECK: Supple; non tender. Full ROM. No cervical LAD  CARD: RRR, no murmurs, rubs or gallops  RESP: L-sided wheezing. No rales, rhonchi, or retractions. No cyanosis or TTP.  ABD: soft, + BS, non-tender, non-distended, no rebound or guarding. No CVA tenderness  EXT: Full ROM, no bony tenderness, no pedal edema, no calf tenderness  NEURO: normal motor. normal sensory. CN II-XII intact. Cerebellar testing normal. Normal gait.  PSYCH: Cooperative, appropriate.  MSK: R-shoulder with FROM, no point TTP, sensation intact. No crepitus, streaking, dislocation, laxity, or ecchymosis.

## 2023-07-30 ENCOUNTER — INPATIENT (INPATIENT)
Facility: HOSPITAL | Age: 68
LOS: 3 days | Discharge: HOME HEALTH | End: 2023-08-03
Attending: INTERNAL MEDICINE | Admitting: INTERNAL MEDICINE
Payer: MEDICARE

## 2023-07-30 VITALS
RESPIRATION RATE: 25 BRPM | WEIGHT: 179.9 LBS | TEMPERATURE: 98 F | SYSTOLIC BLOOD PRESSURE: 168 MMHG | HEART RATE: 100 BPM | OXYGEN SATURATION: 91 % | DIASTOLIC BLOOD PRESSURE: 75 MMHG | HEIGHT: 68 IN

## 2023-07-30 DIAGNOSIS — Z98.49 CATARACT EXTRACTION STATUS, UNSPECIFIED EYE: Chronic | ICD-10-CM

## 2023-07-30 DIAGNOSIS — J96.01 ACUTE RESPIRATORY FAILURE WITH HYPOXIA: ICD-10-CM

## 2023-07-30 DIAGNOSIS — J45.901 UNSPECIFIED ASTHMA WITH (ACUTE) EXACERBATION: ICD-10-CM

## 2023-07-30 LAB
ALBUMIN SERPL ELPH-MCNC: 3.9 G/DL — SIGNIFICANT CHANGE UP (ref 3.3–5)
ALP SERPL-CCNC: 88 U/L — SIGNIFICANT CHANGE UP (ref 40–120)
ALT FLD-CCNC: 33 U/L — SIGNIFICANT CHANGE UP (ref 12–78)
ANION GAP SERPL CALC-SCNC: 4 MMOL/L — LOW (ref 5–17)
APTT BLD: 21 SEC — LOW (ref 24.5–35.6)
AST SERPL-CCNC: 24 U/L — SIGNIFICANT CHANGE UP (ref 15–37)
BASE EXCESS BLDA CALC-SCNC: 1.5 MMOL/L — SIGNIFICANT CHANGE UP (ref -2–3)
BASE EXCESS BLDV CALC-SCNC: 5.4 MMOL/L — HIGH (ref -2–3)
BASOPHILS # BLD AUTO: 0.12 K/UL — SIGNIFICANT CHANGE UP (ref 0–0.2)
BASOPHILS NFR BLD AUTO: 0.7 % — SIGNIFICANT CHANGE UP (ref 0–2)
BILIRUB SERPL-MCNC: 0.6 MG/DL — SIGNIFICANT CHANGE UP (ref 0.2–1.2)
BLOOD GAS COMMENTS ARTERIAL: SIGNIFICANT CHANGE UP
BLOOD GAS COMMENTS, VENOUS: SIGNIFICANT CHANGE UP
BUN SERPL-MCNC: 15 MG/DL — SIGNIFICANT CHANGE UP (ref 7–23)
CALCIUM SERPL-MCNC: 8.9 MG/DL — SIGNIFICANT CHANGE UP (ref 8.5–10.1)
CHLORIDE BLDV-SCNC: 105 MMOL/L — SIGNIFICANT CHANGE UP (ref 98–107)
CHLORIDE SERPL-SCNC: 108 MMOL/L — SIGNIFICANT CHANGE UP (ref 96–108)
CO2 BLDA-SCNC: 29 MMOL/L — HIGH (ref 19–24)
CO2 BLDV-SCNC: 39 MMOL/L — HIGH (ref 22–26)
CO2 SERPL-SCNC: 33 MMOL/L — HIGH (ref 22–31)
CREAT SERPL-MCNC: 0.97 MG/DL — SIGNIFICANT CHANGE UP (ref 0.5–1.3)
EGFR: 84 ML/MIN/1.73M2 — SIGNIFICANT CHANGE UP
EOSINOPHIL # BLD AUTO: 0.32 K/UL — SIGNIFICANT CHANGE UP (ref 0–0.5)
EOSINOPHIL NFR BLD AUTO: 2 % — SIGNIFICANT CHANGE UP (ref 0–6)
GAS PNL BLDA: SIGNIFICANT CHANGE UP
GAS PNL BLDV: 141 MMOL/L — SIGNIFICANT CHANGE UP (ref 136–145)
GAS PNL BLDV: SIGNIFICANT CHANGE UP
GAS PNL BLDV: SIGNIFICANT CHANGE UP
GLUCOSE BLDV-MCNC: 132 MG/DL — HIGH (ref 65–95)
GLUCOSE SERPL-MCNC: 127 MG/DL — HIGH (ref 70–99)
HCO3 BLDA-SCNC: 28 MMOL/L — SIGNIFICANT CHANGE UP (ref 21–28)
HCO3 BLDV-SCNC: 36 MMOL/L — HIGH (ref 22–28)
HCT VFR BLD CALC: 47.7 % — SIGNIFICANT CHANGE UP (ref 39–50)
HCT VFR BLDA CALC: 46 % — SIGNIFICANT CHANGE UP (ref 37–47)
HGB BLD CALC-MCNC: 15.4 G/DL — SIGNIFICANT CHANGE UP (ref 12.6–17.4)
HGB BLD-MCNC: 15.1 G/DL — SIGNIFICANT CHANGE UP (ref 13–17)
HOROWITZ INDEX BLDA+IHG-RTO: 0.45 — SIGNIFICANT CHANGE UP
HOROWITZ INDEX BLDV+IHG-RTO: SIGNIFICANT CHANGE UP
IMM GRANULOCYTES NFR BLD AUTO: 0.7 % — SIGNIFICANT CHANGE UP (ref 0–0.9)
INR BLD: 0.92 RATIO — SIGNIFICANT CHANGE UP (ref 0.85–1.18)
LACTATE BLDV-MCNC: 1.4 MMOL/L — HIGH (ref 0.56–1.39)
LIDOCAIN IGE QN: 189 U/L — SIGNIFICANT CHANGE UP (ref 73–393)
LYMPHOCYTES # BLD AUTO: 19.2 % — SIGNIFICANT CHANGE UP (ref 13–44)
LYMPHOCYTES # BLD AUTO: 3.09 K/UL — SIGNIFICANT CHANGE UP (ref 1–3.3)
MAGNESIUM SERPL-MCNC: 2.2 MG/DL — SIGNIFICANT CHANGE UP (ref 1.6–2.6)
MCHC RBC-ENTMCNC: 30.9 PG — SIGNIFICANT CHANGE UP (ref 27–34)
MCHC RBC-ENTMCNC: 31.7 G/DL — LOW (ref 32–36)
MCV RBC AUTO: 97.7 FL — SIGNIFICANT CHANGE UP (ref 80–100)
MONOCYTES # BLD AUTO: 1.55 K/UL — HIGH (ref 0–0.9)
MONOCYTES NFR BLD AUTO: 9.6 % — SIGNIFICANT CHANGE UP (ref 2–14)
NEUTROPHILS # BLD AUTO: 10.89 K/UL — HIGH (ref 1.8–7.4)
NEUTROPHILS NFR BLD AUTO: 67.8 % — SIGNIFICANT CHANGE UP (ref 43–77)
NRBC # BLD: 0 /100 WBCS — SIGNIFICANT CHANGE UP (ref 0–0)
NT-PROBNP SERPL-SCNC: 151 PG/ML — HIGH (ref 0–125)
OTHER CELLS CSF MANUAL: SIGNIFICANT CHANGE UP ML/DL (ref 18–22)
PCO2 BLDA: 49 MMHG — HIGH (ref 32–46)
PCO2 BLDV: 85 MMHG — HIGH (ref 42–55)
PH BLDA: 7.36 — SIGNIFICANT CHANGE UP (ref 7.35–7.45)
PH BLDV: 7.24 — LOW (ref 7.32–7.43)
PLATELET # BLD AUTO: 152 K/UL — SIGNIFICANT CHANGE UP (ref 150–400)
PO2 BLDA: 161 MMHG — HIGH (ref 83–108)
PO2 BLDV: 23 MMHG — LOW (ref 25–45)
POTASSIUM BLDV-SCNC: 4.5 MMOL/L — SIGNIFICANT CHANGE UP (ref 3.5–5.1)
POTASSIUM SERPL-MCNC: 4.3 MMOL/L — SIGNIFICANT CHANGE UP (ref 3.5–5.3)
POTASSIUM SERPL-SCNC: 4.3 MMOL/L — SIGNIFICANT CHANGE UP (ref 3.5–5.3)
PROT SERPL-MCNC: 8.1 GM/DL — SIGNIFICANT CHANGE UP (ref 6–8.3)
PROTHROM AB SERPL-ACNC: 11.1 SEC — SIGNIFICANT CHANGE UP (ref 9.5–13)
RAPID RVP RESULT: SIGNIFICANT CHANGE UP
RBC # BLD: 4.88 M/UL — SIGNIFICANT CHANGE UP (ref 4.2–5.8)
RBC # FLD: 13.8 % — SIGNIFICANT CHANGE UP (ref 10.3–14.5)
SAO2 % BLDA: 100 % — HIGH (ref 94–98)
SAO2 % BLDV: 20.4 % — LOW (ref 94–98)
SARS-COV-2 RNA SPEC QL NAA+PROBE: SIGNIFICANT CHANGE UP
SODIUM SERPL-SCNC: 145 MMOL/L — SIGNIFICANT CHANGE UP (ref 135–145)
TROPONIN I, HIGH SENSITIVITY RESULT: 9.7 NG/L — SIGNIFICANT CHANGE UP
WBC # BLD: 16.08 K/UL — HIGH (ref 3.8–10.5)
WBC # FLD AUTO: 16.08 K/UL — HIGH (ref 3.8–10.5)

## 2023-07-30 PROCEDURE — 99223 1ST HOSP IP/OBS HIGH 75: CPT

## 2023-07-30 PROCEDURE — 93010 ELECTROCARDIOGRAM REPORT: CPT | Mod: 76

## 2023-07-30 PROCEDURE — 99291 CRITICAL CARE FIRST HOUR: CPT | Mod: 25

## 2023-07-30 PROCEDURE — 71045 X-RAY EXAM CHEST 1 VIEW: CPT | Mod: 26

## 2023-07-30 RX ORDER — ACETAMINOPHEN 500 MG
650 TABLET ORAL EVERY 6 HOURS
Refills: 0 | Status: DISCONTINUED | OUTPATIENT
Start: 2023-07-30 | End: 2023-08-03

## 2023-07-30 RX ORDER — LANOLIN ALCOHOL/MO/W.PET/CERES
3 CREAM (GRAM) TOPICAL AT BEDTIME
Refills: 0 | Status: DISCONTINUED | OUTPATIENT
Start: 2023-07-30 | End: 2023-08-03

## 2023-07-30 RX ORDER — IPRATROPIUM/ALBUTEROL SULFATE 18-103MCG
3 AEROSOL WITH ADAPTER (GRAM) INHALATION EVERY 6 HOURS
Refills: 0 | Status: DISCONTINUED | OUTPATIENT
Start: 2023-07-30 | End: 2023-08-03

## 2023-07-30 RX ORDER — ALBUTEROL 90 UG/1
2.5 AEROSOL, METERED ORAL EVERY 4 HOURS
Refills: 0 | Status: DISCONTINUED | OUTPATIENT
Start: 2023-07-30 | End: 2023-07-30

## 2023-07-30 RX ORDER — SODIUM CHLORIDE 9 MG/ML
500 INJECTION INTRAMUSCULAR; INTRAVENOUS; SUBCUTANEOUS ONCE
Refills: 0 | Status: COMPLETED | OUTPATIENT
Start: 2023-07-30 | End: 2023-07-30

## 2023-07-30 RX ORDER — ALBUTEROL 90 UG/1
2.5 AEROSOL, METERED ORAL ONCE
Refills: 0 | Status: DISCONTINUED | OUTPATIENT
Start: 2023-07-30 | End: 2023-07-30

## 2023-07-30 RX ORDER — AZITHROMYCIN 500 MG/1
500 TABLET, FILM COATED ORAL EVERY 24 HOURS
Refills: 0 | Status: COMPLETED | OUTPATIENT
Start: 2023-07-30 | End: 2023-08-01

## 2023-07-30 RX ORDER — HEPARIN SODIUM 5000 [USP'U]/ML
5000 INJECTION INTRAVENOUS; SUBCUTANEOUS EVERY 12 HOURS
Refills: 0 | Status: DISCONTINUED | OUTPATIENT
Start: 2023-07-30 | End: 2023-08-03

## 2023-07-30 RX ORDER — ALBUTEROL 90 UG/1
2.5 AEROSOL, METERED ORAL ONCE
Refills: 0 | Status: COMPLETED | OUTPATIENT
Start: 2023-07-30 | End: 2023-08-01

## 2023-07-30 RX ORDER — CEFTRIAXONE 500 MG/1
1000 INJECTION, POWDER, FOR SOLUTION INTRAMUSCULAR; INTRAVENOUS ONCE
Refills: 0 | Status: COMPLETED | OUTPATIENT
Start: 2023-07-30 | End: 2023-07-30

## 2023-07-30 RX ORDER — ONDANSETRON 8 MG/1
4 TABLET, FILM COATED ORAL EVERY 8 HOURS
Refills: 0 | Status: DISCONTINUED | OUTPATIENT
Start: 2023-07-30 | End: 2023-08-03

## 2023-07-30 RX ORDER — MAGNESIUM SULFATE 500 MG/ML
2 VIAL (ML) INJECTION ONCE
Refills: 0 | Status: COMPLETED | OUTPATIENT
Start: 2023-07-30 | End: 2023-07-30

## 2023-07-30 RX ORDER — IPRATROPIUM/ALBUTEROL SULFATE 18-103MCG
3 AEROSOL WITH ADAPTER (GRAM) INHALATION ONCE
Refills: 0 | Status: COMPLETED | OUTPATIENT
Start: 2023-07-30 | End: 2023-07-30

## 2023-07-30 RX ORDER — ALBUTEROL 90 UG/1
2.5 AEROSOL, METERED ORAL ONCE
Refills: 0 | Status: COMPLETED | OUTPATIENT
Start: 2023-07-30 | End: 2023-07-30

## 2023-07-30 RX ADMIN — Medication 3 MILLILITER(S): at 17:02

## 2023-07-30 RX ADMIN — Medication 2 GRAM(S): at 02:32

## 2023-07-30 RX ADMIN — CEFTRIAXONE 1000 MILLIGRAM(S): 500 INJECTION, POWDER, FOR SOLUTION INTRAMUSCULAR; INTRAVENOUS at 03:31

## 2023-07-30 RX ADMIN — HEPARIN SODIUM 5000 UNIT(S): 5000 INJECTION INTRAVENOUS; SUBCUTANEOUS at 06:26

## 2023-07-30 RX ADMIN — Medication 3 MILLILITER(S): at 02:14

## 2023-07-30 RX ADMIN — Medication 125 MILLIGRAM(S): at 02:12

## 2023-07-30 RX ADMIN — AZITHROMYCIN 255 MILLIGRAM(S): 500 TABLET, FILM COATED ORAL at 06:42

## 2023-07-30 RX ADMIN — HEPARIN SODIUM 5000 UNIT(S): 5000 INJECTION INTRAVENOUS; SUBCUTANEOUS at 18:31

## 2023-07-30 RX ADMIN — Medication 150 GRAM(S): at 02:12

## 2023-07-30 RX ADMIN — Medication 3 MILLILITER(S): at 23:10

## 2023-07-30 RX ADMIN — Medication 100 MILLIGRAM(S): at 22:25

## 2023-07-30 RX ADMIN — Medication 60 MILLIGRAM(S): at 18:06

## 2023-07-30 RX ADMIN — CEFTRIAXONE 100 MILLIGRAM(S): 500 INJECTION, POWDER, FOR SOLUTION INTRAMUSCULAR; INTRAVENOUS at 03:01

## 2023-07-30 RX ADMIN — ALBUTEROL 2.5 MILLIGRAM(S): 90 AEROSOL, METERED ORAL at 03:01

## 2023-07-30 RX ADMIN — Medication 3 MILLILITER(S): at 11:01

## 2023-07-30 RX ADMIN — SODIUM CHLORIDE 500 MILLILITER(S): 9 INJECTION INTRAMUSCULAR; INTRAVENOUS; SUBCUTANEOUS at 03:56

## 2023-07-30 NOTE — ED PROVIDER NOTE - CRITICAL CARE ATTENDING CONTRIBUTION TO CARE
Please inform patient no in person f/u visit needed at this time. If patient has other questions she can be scheduled for virtual visit. Will want to follow hcg <5. Patient should have repeat done this Friday. Order placed.    acute resp distress requiring bipap, mg, frequent serial resp monitoring

## 2023-07-30 NOTE — CHART NOTE - NSCHARTNOTEFT_GEN_A_CORE
Patient seen and ecxamined   currently comfortable with bipap at the bedside   on 4l NC       COPD exacerbation   hypercapnic respiratory failure     Consider Pulmonary consult in am     Patient does not need telemetry

## 2023-07-30 NOTE — ED PROVIDER NOTE - CLINICAL SUMMARY MEDICAL DECISION MAKING FREE TEXT BOX
68M prior hx COPD, asthma, CAD sp stents x 2, PPM who presents with several days of cough with white sputum, with acute SOB onset today with worsening productive white sputum. Denies fevers, chest pain, abd pain, vomiting. Patient was given duoneb x 1 by EMS prior to arrival   - significant resp distress on initial arrival, improvement with mg, bipap, steroids, duonebs, serial reassessments with improvement, will admit, empiric tx for possible pna - possible viral infcn

## 2023-07-30 NOTE — H&P ADULT - HISTORY OF PRESENT ILLNESS
68 year old male with a PMH of  COPD, asthma, CAD sp stents x 2 with ICD, BIBEMS for several days of productive cough with white sputum that is progressively gotten worst today with acute onset of SOB worsening productive white sputum. Patient was found to be hypoxic saturating in the 80%, on route patient was given Duoneb x2 by EMS. Upon ED arrival patient was in respiratory distress,  tachypneic with RR 25 & saturating 88% on RA, using accessory muscles. Patient was placed on BiPap, received Albuterol, dubonebs, Magnesium , solumedrol & Ceftriaxone empirically for PNA . Upon evaluation patient is awake, no acute distress, denies any pain or discomfort. Vitals are stable, Viral panel - Negative.

## 2023-07-30 NOTE — ED PROVIDER NOTE - PROGRESS NOTE DETAILS
Davidson DO: pt clinically more comfortable, good Vt on bipap, abg with improvement, no focal consolidation on xray, will cont abx for empiric tx possible pna

## 2023-07-30 NOTE — ED PROVIDER NOTE - OBJECTIVE STATEMENT
68M prior hx COPD, asthma, CAD sp stents x 2, PPM who presents with several days of cough with white sputum, with acute SOB onset today with worsening productive white sputum. Denies fevers, chest pain, abd pain, vomiting. Patient was given duoneb x 1 by EMS prior to arrival

## 2023-07-30 NOTE — ED ADULT NURSE NOTE - NSFALLUNIVINTERV_ED_ALL_ED
Bed/Stretcher in lowest position, wheels locked, appropriate side rails in place/Call bell, personal items and telephone in reach/Instruct patient to call for assistance before getting out of bed/chair/stretcher/Non-slip footwear applied when patient is off stretcher/[place] to call system/Physically safe environment - no spills, clutter or unnecessary equipment/Purposeful proactive rounding/Room/bathroom lighting operational, light cord in reach

## 2023-07-30 NOTE — H&P ADULT - NSHPPHYSICALEXAM_GEN_ALL_CORE
Gen: AOX3, NAD  Head: NCAT  ENT: Airway patent, moist mucous membranes, nasal passageways clear,  Cardiac: RRR, Normal S1&S2   Respiratory: Wheezing b/l, no labored breathing, no increase work of breathing    Gastrointestinal: +BS,  soft, nontender, nondistended, no rebound, no guarding  MSK: No gross abnormalities, FROM of all four extremities, no edema  Skin: No rashes, no lesions

## 2023-07-30 NOTE — PATIENT PROFILE ADULT - PRO INTERPRETER NEED 2
Anesthesia Pre Eval Note    Anesthesia ROS/Med Hx        Anesthetic Complication History:  Patient does not have a history of anesthetic complications      Pulmonary Review:  Patient does not have a pulmonary history      Neuro/Psych Review:  Patient does not have a neuro/psych history       Cardiovascular Review:    Positive for hypertension    GI/HEPATIC/RENAL Review:  Patient does not have a GI/hepatic/renalhistory       End/Other Review:  Patient does not have an endo/other history        Relevant Problems   No relevant active problems       Physical Exam     Airway   Mallampati: III  TM Distance: >3 FB  Neck ROM: Full    Cardiovascular  Cardiovascular exam normal    General Assessment  General Assessment: Alert and oriented    Dental Exam  Dental exam normal    Pulmonary Exam  Pulmonary exam normal      Anesthesia Plan:    ASA Status: 1  Anesthesia Type: MAC    Induction: Intravenous  Maintenance: TIVA    Post-op Pain Management: Per Surgeon      Checklist  Reviewed: NPO Status, Allergies, Medications, Problem list, Past Med History and Patient Summary  Consent/Risks Discussed Statement:  The proposed anesthetic plan, including its risks and benefits, have been discussed with the Patient along with the risks and benefits of alternatives. Questions were encouraged and answered and the patient and/or representative understands and agrees to proceed.        I discussed with the patient (and/or patient's legal representative) the risks and benefits of the proposed anesthesia plan, MAC, which may include services performed by other anesthesia providers.    Alternative anesthesia plans, if available, were reviewed with the patient (and/or patient's legal representative). Discussion has been held with the patient (and/or patient's legal representative) regarding risks of anesthesia, which include Intra-operative Awareness and emergent situations that may require change in anesthesia plan.    The patient (and/or  patient's legal representative) has indicated understanding, his/her questions have been answered, and he/she wishes to proceed with the planned anesthetic.    Blood Products: Not Anticipated     English

## 2023-07-30 NOTE — ED ADULT NURSE NOTE - ED STAT RN HANDOFF DETAILS
Report given to JAMIL William. Patient is AAOx4, sitting comfortably in bed with no complaints at this time. Respirations equal and unlabored. No acute distress noted at this time.

## 2023-07-30 NOTE — ED PROVIDER NOTE - PHYSICAL EXAMINATION
Gen: AOX3, NAD  Head: NCAT  ENT: Airway patent, moist mucous membranes, nasal passageways clear, + JVD   Cardiac: Normal rate, normal rhythm   Respiratory: diffuse end exp and insp wheezing, poor inspiratory breath sounds b/l, tachypnea and use of accessory muscles, speaking in incomplete sentences, O2sat on supplemental O2 88% on initial arrival.   Gastrointestinal: Abdomen soft, nontender, nondistended, no rebound, no guarding  MSK: No gross abnormalities, FROM of all four extremities, no edema  HEME: Extremities warm, pulses intact and symmetrical in all four extremities  Skin: No rashes, no lesions  Neuro: No gross neurologic deficits,  strength equal in all four extremities

## 2023-07-30 NOTE — H&P ADULT - NSHPLABSRESULTS_GEN_ALL_CORE
15.1   16.08 )-----------( 152      ( 30 Jul 2023 02:15 )             47.7     07-30    145  |  108  |  15  ----------------------------<  127<H>  4.3   |  33<H>  |  0.97    Ca    8.9      30 Jul 2023 02:15  Mg     2.2     07-30    TPro  8.1  /  Alb  3.9  /  TBili  0.6  /  DBili  x   /  AST  24  /  ALT  33  /  AlkPhos  88  07-30    PT/INR - ( 30 Jul 2023 02:15 )   PT: 11.1 sec;   INR: 0.92 ratio         PTT - ( 30 Jul 2023 02:15 )  PTT:21.0 sec  Urinalysis Basic - ( 30 Jul 2023 02:15 )    Color: x / Appearance: x / SG: x / pH: x  Gluc: 127 mg/dL / Ketone: x  / Bili: x / Urobili: x   Blood: x / Protein: x / Nitrite: x   Leuk Esterase: x / RBC: x / WBC x   Sq Epi: x / Non Sq Epi: x / Bacteria: x

## 2023-07-30 NOTE — PATIENT PROFILE ADULT - FUNCTIONAL ASSESSMENT - BASIC MOBILITY 6.
Not able to assess (calculate score using AMPAC averaging method) 4-calculated by average /Not able to assess (calculate score using [facility] averaging method)

## 2023-07-30 NOTE — ED ADULT TRIAGE NOTE - CHIEF COMPLAINT QUOTE
bibems from home for difficulty breathing x2 days.  Pt was sating low 80s on room air.  Pt received 2 duo neb treatments enroute.  hx of COPD, Asthma, emphysema, Pacemaker, cardiac stents

## 2023-07-30 NOTE — ED PROVIDER NOTE - NS ED ROS FT
Gen: No fever, normal appetite  Resp: see HPI   Cardiovascular: No chest pain or palpitation  Gastroenteric: No nausea/vomiting, or abd pain   :  No change in urine output; no dysuria  MS: No joint or muscle pain  Skin: No rashes  Neuro: No headache; no abnormal movements  Remainder negative, except as per the HPI

## 2023-07-30 NOTE — H&P ADULT - PROBLEM SELECTOR PLAN 2
Exacerbation with hypoxia responded to TX   S/P Albuterol, Duoneb, Magnesium & Solumedrol   - Duoneb   - Solumedrol   - Azithromycin   - Monitor

## 2023-07-30 NOTE — H&P ADULT - PROBLEM SELECTOR PLAN 1
Acute respiratory distress 2/2 to asthma exacerbation   Currently on Bi-PAP, No acute distress, saturating well  CXR: unremarkable   - Tele   - Wean off Bi-pap  - Monitor Resp status

## 2023-07-30 NOTE — ED ADULT NURSE NOTE - OBJECTIVE STATEMENT
Pt AAOx4. 68 year old male BIBEMS from home with complaint of dyspnea x 2 days. Per EMS, pt SpO2 was 80% on room air. Duonebsx2 given by EMS en route.  Dr Davidson at bedside to evaluate. Pt placed on NRB prior to respiratory coming to bedside to place BiPAP. Pt SpO2 % on BiPAP. Denies chest pain. Respirations equal and unlabored. No acute distress noted at this time. Pt states he feels much better now. Patient placed on cardiac and SpO2 monitor at bedside.

## 2023-07-31 LAB
A1C WITH ESTIMATED AVERAGE GLUCOSE RESULT: 5.9 % — HIGH (ref 4–5.6)
ALBUMIN SERPL ELPH-MCNC: 3 G/DL — LOW (ref 3.3–5)
ALP SERPL-CCNC: 59 U/L — SIGNIFICANT CHANGE UP (ref 40–120)
ALT FLD-CCNC: 27 U/L — SIGNIFICANT CHANGE UP (ref 12–78)
ANION GAP SERPL CALC-SCNC: 6 MMOL/L — SIGNIFICANT CHANGE UP (ref 5–17)
AST SERPL-CCNC: 20 U/L — SIGNIFICANT CHANGE UP (ref 15–37)
BASE EXCESS BLDA CALC-SCNC: 7.5 MMOL/L — HIGH (ref -2–3)
BILIRUB SERPL-MCNC: 0.5 MG/DL — SIGNIFICANT CHANGE UP (ref 0.2–1.2)
BLOOD GAS COMMENTS ARTERIAL: SIGNIFICANT CHANGE UP
BUN SERPL-MCNC: 13 MG/DL — SIGNIFICANT CHANGE UP (ref 7–23)
CALCIUM SERPL-MCNC: 9 MG/DL — SIGNIFICANT CHANGE UP (ref 8.5–10.1)
CHLORIDE SERPL-SCNC: 105 MMOL/L — SIGNIFICANT CHANGE UP (ref 96–108)
CHOLEST SERPL-MCNC: 144 MG/DL — SIGNIFICANT CHANGE UP
CO2 BLDA-SCNC: 34 MMOL/L — HIGH (ref 19–24)
CO2 SERPL-SCNC: 27 MMOL/L — SIGNIFICANT CHANGE UP (ref 22–31)
CREAT SERPL-MCNC: 0.82 MG/DL — SIGNIFICANT CHANGE UP (ref 0.5–1.3)
EGFR: 94 ML/MIN/1.73M2 — SIGNIFICANT CHANGE UP
ESTIMATED AVERAGE GLUCOSE: 123 MG/DL — HIGH (ref 68–114)
GAS PNL BLDA: SIGNIFICANT CHANGE UP
GLUCOSE SERPL-MCNC: 142 MG/DL — HIGH (ref 70–99)
HCO3 BLDA-SCNC: 33 MMOL/L — HIGH (ref 21–28)
HCT VFR BLD CALC: 37.5 % — LOW (ref 39–50)
HCV AB S/CO SERPL IA: 0.32 S/CO — SIGNIFICANT CHANGE UP (ref 0–0.99)
HCV AB SERPL-IMP: SIGNIFICANT CHANGE UP
HDLC SERPL-MCNC: 74 MG/DL — SIGNIFICANT CHANGE UP
HGB BLD-MCNC: 12.5 G/DL — LOW (ref 13–17)
HOROWITZ INDEX BLDA+IHG-RTO: 0.28 — SIGNIFICANT CHANGE UP
LIPID PNL WITH DIRECT LDL SERPL: 60 MG/DL — SIGNIFICANT CHANGE UP
MAGNESIUM SERPL-MCNC: 2.3 MG/DL — SIGNIFICANT CHANGE UP (ref 1.6–2.6)
MCHC RBC-ENTMCNC: 31.3 PG — SIGNIFICANT CHANGE UP (ref 27–34)
MCHC RBC-ENTMCNC: 33.3 G/DL — SIGNIFICANT CHANGE UP (ref 32–36)
MCV RBC AUTO: 94 FL — SIGNIFICANT CHANGE UP (ref 80–100)
NON HDL CHOLESTEROL: 70 MG/DL — SIGNIFICANT CHANGE UP
NRBC # BLD: 0 /100 WBCS — SIGNIFICANT CHANGE UP (ref 0–0)
PCO2 BLDA: 47 MMHG — HIGH (ref 32–46)
PH BLDA: 7.45 — SIGNIFICANT CHANGE UP (ref 7.35–7.45)
PHOSPHATE SERPL-MCNC: 3.4 MG/DL — SIGNIFICANT CHANGE UP (ref 2.5–4.5)
PLATELET # BLD AUTO: 173 K/UL — SIGNIFICANT CHANGE UP (ref 150–400)
PO2 BLDA: 77 MMHG — LOW (ref 83–108)
POTASSIUM SERPL-MCNC: 4.4 MMOL/L — SIGNIFICANT CHANGE UP (ref 3.5–5.3)
POTASSIUM SERPL-SCNC: 4.4 MMOL/L — SIGNIFICANT CHANGE UP (ref 3.5–5.3)
PROT SERPL-MCNC: 6.5 GM/DL — SIGNIFICANT CHANGE UP (ref 6–8.3)
RBC # BLD: 3.99 M/UL — LOW (ref 4.2–5.8)
RBC # FLD: 13.5 % — SIGNIFICANT CHANGE UP (ref 10.3–14.5)
SAO2 % BLDA: 95.9 % — SIGNIFICANT CHANGE UP (ref 94–98)
SODIUM SERPL-SCNC: 138 MMOL/L — SIGNIFICANT CHANGE UP (ref 135–145)
TRIGL SERPL-MCNC: 44 MG/DL — SIGNIFICANT CHANGE UP
WBC # BLD: 14.03 K/UL — HIGH (ref 3.8–10.5)
WBC # FLD AUTO: 14.03 K/UL — HIGH (ref 3.8–10.5)

## 2023-07-31 PROCEDURE — 99232 SBSQ HOSP IP/OBS MODERATE 35: CPT

## 2023-07-31 RX ORDER — ASPIRIN/CALCIUM CARB/MAGNESIUM 324 MG
81 TABLET ORAL DAILY
Refills: 0 | Status: DISCONTINUED | OUTPATIENT
Start: 2023-07-31 | End: 2023-08-03

## 2023-07-31 RX ORDER — ALBUTEROL 90 UG/1
2.5 AEROSOL, METERED ORAL EVERY 6 HOURS
Refills: 0 | Status: DISCONTINUED | OUTPATIENT
Start: 2023-07-31 | End: 2023-07-31

## 2023-07-31 RX ORDER — BUDESONIDE AND FORMOTEROL FUMARATE DIHYDRATE 160; 4.5 UG/1; UG/1
2 AEROSOL RESPIRATORY (INHALATION)
Refills: 0 | Status: DISCONTINUED | OUTPATIENT
Start: 2023-07-31 | End: 2023-08-03

## 2023-07-31 RX ADMIN — Medication 3 MILLILITER(S): at 17:51

## 2023-07-31 RX ADMIN — Medication 3 MILLILITER(S): at 06:13

## 2023-07-31 RX ADMIN — Medication 40 MILLIGRAM(S): at 06:28

## 2023-07-31 RX ADMIN — Medication 3 MILLILITER(S): at 12:42

## 2023-07-31 RX ADMIN — Medication 3 MILLILITER(S): at 23:10

## 2023-07-31 RX ADMIN — Medication 100 MILLIGRAM(S): at 14:23

## 2023-07-31 RX ADMIN — HEPARIN SODIUM 5000 UNIT(S): 5000 INJECTION INTRAVENOUS; SUBCUTANEOUS at 06:29

## 2023-07-31 RX ADMIN — Medication 81 MILLIGRAM(S): at 18:35

## 2023-07-31 RX ADMIN — HEPARIN SODIUM 5000 UNIT(S): 5000 INJECTION INTRAVENOUS; SUBCUTANEOUS at 18:35

## 2023-07-31 RX ADMIN — Medication 100 MILLIGRAM(S): at 06:28

## 2023-07-31 RX ADMIN — AZITHROMYCIN 255 MILLIGRAM(S): 500 TABLET, FILM COATED ORAL at 06:29

## 2023-07-31 RX ADMIN — Medication 100 MILLIGRAM(S): at 21:19

## 2023-07-31 NOTE — PROGRESS NOTE ADULT - ASSESSMENT
69 yo male w/ pmhx of COPD, asthma, CAD spx 2 stents with AICD admitted to [facility] for acute hypoxemic respiratory failure 2/2 COPD/asthma exacerbation     PULMNOLOGY  # COPD  # asthma  # asthma/copd exacerbation  # acute hypoxemic respiratory failure- resolved   - breathing better than when he came in  - sp02 ~ > 90% on room air  - prednisone, zithromax    CARDIOLOGY  # hx of cad  # HF w/ ReF?    PLAN  - c/w [facility]  - albuterol neb, prednisone, singulair, symbicort, zithromax  - consult PULM

## 2023-07-31 NOTE — PROGRESS NOTE ADULT - SUBJECTIVE AND OBJECTIVE BOX
Patient is a 68y old  Male who presents with a chief complaint of Acute Respiratory Failure with Hypoxia/ Asthma Exacerbation (30 Jul 2023 05:58)    INTERVAL HPI/OVERNIGHT EVENTS:    MEDICATIONS  (STANDING):  albuterol    0.083%. 2.5 milliGRAM(s) Nebulizer once  albuterol   0.5% 2.5 milliGRAM(s) Nebulizer every 6 hours  albuterol/ipratropium for Nebulization 3 milliLiter(s) Nebulizer every 6 hours  azithromycin  IVPB 500 milliGRAM(s) IV Intermittent every 24 hours  benzonatate 100 milliGRAM(s) Oral three times a day  budesonide 160 MICROgram(s)/formoterol 4.5 MICROgram(s) Inhaler 2 Puff(s) Inhalation two times a day  heparin   Injectable 5000 Unit(s) SubCutaneous every 12 hours  predniSONE   Tablet 40 milliGRAM(s) Oral daily    MEDICATIONS  (PRN):  acetaminophen     Tablet .. 650 milliGRAM(s) Oral every 6 hours PRN Temp greater or equal to 38C (100.4F), Mild Pain (1 - 3)  aluminum hydroxide/magnesium hydroxide/simethicone Suspension 30 milliLiter(s) Oral every 4 hours PRN Dyspepsia  melatonin 3 milliGRAM(s) Oral at bedtime PRN Insomnia  ondansetron Injectable 4 milliGRAM(s) IV Push every 8 hours PRN Nausea and/or Vomiting    Allergies    No Known Allergies    Intolerances      REVIEW OF SYSTEMS:  All other systems reviewed and are negative    Vital Signs Last 24 Hrs  T(C): 36.6 (31 Jul 2023 11:13), Max: 36.6 (31 Jul 2023 11:13)  T(F): 97.8 (31 Jul 2023 11:13), Max: 97.8 (31 Jul 2023 11:13)  HR: 87 (31 Jul 2023 12:42) (64 - 89)  BP: 123/72 (31 Jul 2023 11:13) (95/59 - 137/79)  BP(mean): --  RR: 20 (31 Jul 2023 11:13) (18 - 20)  SpO2: 94% (31 Jul 2023 12:42) (92% - 97%)    Parameters below as of 31 Jul 2023 12:42  Patient On (Oxygen Delivery Method): nasal cannula, 2L      Daily     Daily   I&O's Summary    CAPILLARY BLOOD GLUCOSE        PHYSICAL EXAM:  GENERAL: NAD, well-groomed, well-developed  HEAD:  Atraumatic, Normocephalic  EYES: EOMI, PERRLA, conjunctiva and sclera clear  ENMT: No tonsillar erythema, exudates, or enlargement; Moist mucous membranes, Good dentition, No lesions  NECK: Supple, No JVD, Normal thyroid  NERVOUS SYSTEM:  Alert & Oriented X3, Good concentration; Motor Strength 5/5 B/L upper and lower extremities; DTRs 2+ intact and symmetric  CHEST/LUNG: Clear to percussion bilaterally; No rales, rhonchi, wheezing, or rubs  HEART: Regular rate and rhythm; No murmurs, rubs, or gallops  ABDOMEN: Soft, Nontender, Nondistended; Bowel sounds present  EXTREMITIES:  2+ Peripheral Pulses, No clubbing, cyanosis, or edema  LYMPH: No lymphadenopathy noted  SKIN: No rashes or lesions    Labs                          12.5   14.03 )-----------( 173      ( 31 Jul 2023 06:06 )             37.5     07-31    138  |  105  |  13  ----------------------------<  142<H>  4.4   |  27  |  0.82    Ca    9.0      31 Jul 2023 06:06  Phos  3.4     07-31  Mg     2.3     07-31    TPro  6.5  /  Alb  3.0<L>  /  TBili  0.5  /  DBili  x   /  AST  20  /  ALT  27  /  AlkPhos  59  07-31    PT/INR - ( 30 Jul 2023 02:15 )   PT: 11.1 sec;   INR: 0.92 ratio         PTT - ( 30 Jul 2023 02:15 )  PTT:21.0 sec      Urinalysis Basic - ( 31 Jul 2023 06:06 )    Color: x / Appearance: x / SG: x / pH: x  Gluc: 142 mg/dL / Ketone: x  / Bili: x / Urobili: x   Blood: x / Protein: x / Nitrite: x   Leuk Esterase: x / RBC: x / WBC x   Sq Epi: x / Non Sq Epi: x / Bacteria: x

## 2023-08-01 LAB
ALBUMIN SERPL ELPH-MCNC: 3.2 G/DL — LOW (ref 3.3–5)
ALP SERPL-CCNC: 58 U/L — SIGNIFICANT CHANGE UP (ref 40–120)
ALT FLD-CCNC: 26 U/L — SIGNIFICANT CHANGE UP (ref 12–78)
ANION GAP SERPL CALC-SCNC: 3 MMOL/L — LOW (ref 5–17)
AST SERPL-CCNC: 17 U/L — SIGNIFICANT CHANGE UP (ref 15–37)
BILIRUB SERPL-MCNC: 0.6 MG/DL — SIGNIFICANT CHANGE UP (ref 0.2–1.2)
BUN SERPL-MCNC: 20 MG/DL — SIGNIFICANT CHANGE UP (ref 7–23)
CALCIUM SERPL-MCNC: 8.8 MG/DL — SIGNIFICANT CHANGE UP (ref 8.5–10.1)
CHLORIDE SERPL-SCNC: 106 MMOL/L — SIGNIFICANT CHANGE UP (ref 96–108)
CO2 SERPL-SCNC: 32 MMOL/L — HIGH (ref 22–31)
CREAT SERPL-MCNC: 0.96 MG/DL — SIGNIFICANT CHANGE UP (ref 0.5–1.3)
EGFR: 85 ML/MIN/1.73M2 — SIGNIFICANT CHANGE UP
GLUCOSE SERPL-MCNC: 108 MG/DL — HIGH (ref 70–99)
HCT VFR BLD CALC: 38.6 % — LOW (ref 39–50)
HGB BLD-MCNC: 12.9 G/DL — LOW (ref 13–17)
MAGNESIUM SERPL-MCNC: 2.2 MG/DL — SIGNIFICANT CHANGE UP (ref 1.6–2.6)
MCHC RBC-ENTMCNC: 31.4 PG — SIGNIFICANT CHANGE UP (ref 27–34)
MCHC RBC-ENTMCNC: 33.4 G/DL — SIGNIFICANT CHANGE UP (ref 32–36)
MCV RBC AUTO: 93.9 FL — SIGNIFICANT CHANGE UP (ref 80–100)
NRBC # BLD: 0 /100 WBCS — SIGNIFICANT CHANGE UP (ref 0–0)
PHOSPHATE SERPL-MCNC: 2.8 MG/DL — SIGNIFICANT CHANGE UP (ref 2.5–4.5)
PLATELET # BLD AUTO: 189 K/UL — SIGNIFICANT CHANGE UP (ref 150–400)
POTASSIUM SERPL-MCNC: 4.1 MMOL/L — SIGNIFICANT CHANGE UP (ref 3.5–5.3)
POTASSIUM SERPL-SCNC: 4.1 MMOL/L — SIGNIFICANT CHANGE UP (ref 3.5–5.3)
PROT SERPL-MCNC: 6.5 GM/DL — SIGNIFICANT CHANGE UP (ref 6–8.3)
RBC # BLD: 4.11 M/UL — LOW (ref 4.2–5.8)
RBC # FLD: 13.7 % — SIGNIFICANT CHANGE UP (ref 10.3–14.5)
SODIUM SERPL-SCNC: 141 MMOL/L — SIGNIFICANT CHANGE UP (ref 135–145)
WBC # BLD: 12.65 K/UL — HIGH (ref 3.8–10.5)
WBC # FLD AUTO: 12.65 K/UL — HIGH (ref 3.8–10.5)

## 2023-08-01 PROCEDURE — 99232 SBSQ HOSP IP/OBS MODERATE 35: CPT

## 2023-08-01 RX ORDER — ATORVASTATIN CALCIUM 80 MG/1
1 TABLET, FILM COATED ORAL
Refills: 0 | DISCHARGE

## 2023-08-01 RX ORDER — BENRALIZUMAB 30 MG/ML
30 INJECTION, SOLUTION SUBCUTANEOUS
Refills: 0 | DISCHARGE

## 2023-08-01 RX ORDER — ASPIRIN/CALCIUM CARB/MAGNESIUM 324 MG
1 TABLET ORAL
Refills: 0 | DISCHARGE

## 2023-08-01 RX ORDER — METOPROLOL TARTRATE 50 MG
1 TABLET ORAL
Refills: 0 | DISCHARGE

## 2023-08-01 RX ADMIN — HEPARIN SODIUM 5000 UNIT(S): 5000 INJECTION INTRAVENOUS; SUBCUTANEOUS at 06:01

## 2023-08-01 RX ADMIN — Medication 3 MILLILITER(S): at 18:28

## 2023-08-01 RX ADMIN — ALBUTEROL 2.5 MILLIGRAM(S): 90 AEROSOL, METERED ORAL at 21:52

## 2023-08-01 RX ADMIN — Medication 3 MILLILITER(S): at 23:25

## 2023-08-01 RX ADMIN — Medication 100 MILLIGRAM(S): at 05:50

## 2023-08-01 RX ADMIN — BUDESONIDE AND FORMOTEROL FUMARATE DIHYDRATE 2 PUFF(S): 160; 4.5 AEROSOL RESPIRATORY (INHALATION) at 05:58

## 2023-08-01 RX ADMIN — AZITHROMYCIN 255 MILLIGRAM(S): 500 TABLET, FILM COATED ORAL at 05:51

## 2023-08-01 RX ADMIN — Medication 40 MILLIGRAM(S): at 05:50

## 2023-08-01 RX ADMIN — Medication 3 MILLILITER(S): at 11:42

## 2023-08-01 RX ADMIN — BUDESONIDE AND FORMOTEROL FUMARATE DIHYDRATE 2 PUFF(S): 160; 4.5 AEROSOL RESPIRATORY (INHALATION) at 18:49

## 2023-08-01 RX ADMIN — Medication 81 MILLIGRAM(S): at 12:39

## 2023-08-01 RX ADMIN — Medication 100 MILLIGRAM(S): at 14:59

## 2023-08-01 RX ADMIN — HEPARIN SODIUM 5000 UNIT(S): 5000 INJECTION INTRAVENOUS; SUBCUTANEOUS at 18:34

## 2023-08-01 RX ADMIN — Medication 3 MILLILITER(S): at 05:33

## 2023-08-01 NOTE — CONSULT NOTE ADULT - SUBJECTIVE AND OBJECTIVE BOX
Patient is a 68y old  Male who presents with a chief complaint of Acute Respiratory Failure with Hypoxia/ Asthma Exacerbation (31 Jul 2023 13:52)      HPI:  68 year old male with a PMH of  COPD, asthma, CAD sp stents x 2 with ICD, BIBEMS for several days of productive cough with white sputum that is progressively gotten worst today with acute onset of SOB worsening productive white sputum. Patient was found to be hypoxic saturating in the 80%, on route patient was given Duoneb x2 by EMS. Upon ED arrival patient was in respiratory distress,  tachypneic with RR 25 & saturating 88% on RA, using accessory muscles. Patient was placed on BiPap, received Albuterol, dubonebs, Magnesium , solumedrol & Ceftriaxone empirically for PNA . Upon evaluation patient is awake, no acute distress, denies any pain or discomfort. Vitals are stable, Viral panel - Negative.    (30 Jul 2023 05:58)      PAST MEDICAL & SURGICAL HISTORY:  Asthma (ICD9 493.90)      Emphysema (ICD9 492.8)  COPD      CMC arthritis      Chronic sinusitis      Esophageal reflux      Nasal polyps      Eosinophilic gastritis      S/P Cholecystectomy (ICD9 V45.79)      S/P cataract extraction  both eyes          FAMILY HISTORY:    SOCIAL HISTORY:     Allergies  No Known Allergies          MEDICATIONS  (STANDING):  albuterol    0.083%. 2.5 milliGRAM(s) Nebulizer once  albuterol/ipratropium for Nebulization 3 milliLiter(s) Nebulizer every 6 hours  aspirin  chewable 81 milliGRAM(s) Oral daily  benzonatate 100 milliGRAM(s) Oral three times a day  budesonide 160 MICROgram(s)/formoterol 4.5 MICROgram(s) Inhaler 2 Puff(s) Inhalation two times a day  heparin   Injectable 5000 Unit(s) SubCutaneous every 12 hours  predniSONE   Tablet 40 milliGRAM(s) Oral daily    MEDICATIONS  (PRN):  acetaminophen     Tablet .. 650 milliGRAM(s) Oral every 6 hours PRN Temp greater or equal to 38C (100.4F), Mild Pain (1 - 3)  aluminum hydroxide/magnesium hydroxide/simethicone Suspension 30 milliLiter(s) Oral every 4 hours PRN Dyspepsia  melatonin 3 milliGRAM(s) Oral at bedtime PRN Insomnia  ondansetron Injectable 4 milliGRAM(s) IV Push every 8 hours PRN Nausea and/or Vomiting    REVIEW OF SYSTEMS:    Constitutional:            No fever, weight loss or fatigue  HEENT:                         No difficulty hearing, tinnitus, vertigo; No sinus or throat pain  Respiratory:   Cardiovascular:           No chest pain, palpitations  Gastrointestinal:        No abdominal or epigastric pain. No N/V/diarrhea or hematemesis  Genitourinary:            No dysuria, frequency, hematuria or incontinence  SKIN:                             no rash  Musculoskeletal:        No joint pain or swelling  Extremities:                No swelling  Neurological:              No headaches  Psychiatric:                 No depression, anxiety    PQRS:  Vaccines - Influenza and Pneumovax:  BMI:  Tobacco:  Depression:   Colorectal Screening:  Breast Cancer Screening:  Blood Presssure Screening / Control of:  HbAIc:  Ischemic Vascular Disease:  Current Medications Reviewed:    Vital Signs Last 24 Hrs  T(C): 36.3 (01 Aug 2023 04:49), Max: 36.6 (31 Jul 2023 11:13)  T(F): 97.3 (01 Aug 2023 04:49), Max: 97.8 (31 Jul 2023 11:13)  HR: 85 (01 Aug 2023 09:45) (58 - 95)  BP: 113/72 (01 Aug 2023 04:49) (109/73 - 129/73)  BP(mean): --  RR: 16 (01 Aug 2023 04:49) (16 - 20)  SpO2: 94% (01 Aug 2023 09:45) (94% - 99%)    Parameters below as of 01 Aug 2023 06:04  Patient On (Oxygen Delivery Method): nasal cannula, 2        PHYSICAL EXAM:  GEN:         Awake, responsive and comfortable.  HEENT:    Normal.    RESP:   CVS:             Regular rate and rhythm.   ABD:         Soft, non-tender, non-distended;   :             No costovertebral angle tenderness  SKIN:           Warm and dry.  EXTR:            No clubbing, cyanosis or edema  CNS:              Intact sensory and motor function.  PSYCH:        cooperative, no anxiety or depression            LABS:                        12.9   12.65 )-----------( 189      ( 01 Aug 2023 06:20 )             38.6     08-01    141  |  106  |  20  ----------------------------<  108<H>  4.1   |  32<H>  |  0.96    Ca    8.8      01 Aug 2023 06:20  Phos  2.8     08-01  Mg     2.2     08-01    TPro  6.5  /  Alb  3.2<L>  /  TBili  0.6  /  DBili  x   /  AST  17  /  ALT  26  /  AlkPhos  58  08-01      07-31 @ 04:05  pH: --  pCO2: 47  pO2: 77  SaO2: 95.9  07-30 @ 03:04  pH: --  pCO2: 49  pO2: 161  SaO2: 100.0    Urinalysis Basic - ( 01 Aug 2023 06:20 )    Color: x / Appearance: x / SG: x / pH: x  Gluc: 108 mg/dL / Ketone: x  / Bili: x / Urobili: x   Blood: x / Protein: x / Nitrite: x   Leuk Esterase: x / RBC: x / WBC x   Sq Epi: x / Non Sq Epi: x / Bacteria: x              EKG:     RADIOLOGY & ADDITIONAL STUDIES:    ASSESSMENT AND PLAN:  Patient is a 68y old  Male who presents with a chief complaint of Acute Respiratory Failure with Hypoxia/ Asthma Exacerbation (31 Jul 2023 13:52)    HPI:  68 year old male with COPD on home O2 ( Symbicort, Spiriva and PRN Albuterol ), Asthma, CAD sp stents x 2 with ICD, Esophageal reflex, Chronic Sinusitis, Eosinophilic gastritis.  40+ year smoking, quit 14 years ago  Brought  for several days of productive cough with white sputum that progressively got worst.  This led to  acute onset of SOB worsening productive white sputum.   Was found to be hypoxic saturating in the 80%, on route patient was given Duoneb x2 by EMS.   Upon ED arrival patient was in respiratory distress,  tachypneic with RR 25 & saturating 88% on RA, using accessory muscles.   Got placed on BiPAP,  received Albuterol, duonebs, Magnesium , solumedrol & Ceftriaxone empirically for PNA .  Not sure about trigger but says it may be weather related, or he may have gotten some thing from his grand kids.    PAST MEDICAL & SURGICAL HISTORY:  Asthma (ICD9 493.90)    Emphysema (ICD9 492.8)  COPD    CMC arthritis    Chronic sinusitis    Esophageal reflux    Nasal polyps    Eosinophilic gastritis    S/P Cholecystectomy (ICD9 V45.79)    S/P cataract extraction  both eyes    SOCIAL HISTORY:     Allergies  No Known Allergies    MEDICATIONS  (STANDING):  albuterol    0.083%. 2.5 milliGRAM(s) Nebulizer once  albuterol/ipratropium for Nebulization 3 milliLiter(s) Nebulizer every 6 hours  aspirin  chewable 81 milliGRAM(s) Oral daily  benzonatate 100 milliGRAM(s) Oral three times a day  budesonide 160 MICROgram(s)/formoterol 4.5 MICROgram(s) Inhaler 2 Puff(s) Inhalation two times a day  heparin   Injectable 5000 Unit(s) SubCutaneous every 12 hours  predniSONE   Tablet 40 milliGRAM(s) Oral daily    MEDICATIONS  (PRN):  acetaminophen     Tablet .. 650 milliGRAM(s) Oral every 6 hours PRN Temp greater or equal to 38C (100.4F), Mild Pain (1 - 3)  aluminum hydroxide/magnesium hydroxide/simethicone Suspension 30 milliLiter(s) Oral every 4 hours PRN Dyspepsia  melatonin 3 milliGRAM(s) Oral at bedtime PRN Insomnia  ondansetron Injectable 4 milliGRAM(s) IV Push every 8 hours PRN Nausea and/or Vomiting    REVIEW OF SYSTEMS:    Constitutional:            No fever, weight loss or fatigue  HEENT:                       No difficulty hearing, tinnitus, vertigo; No sinus or throat pain  Respiratory:               SOB and productive cough.  Cardiovascular:           No chest pain, palpitations  Gastrointestinal:        No abdominal or epigastric pain. No N/V/diarrhea or hematemesis  Genitourinary:            No dysuria, frequency, hematuria or incontinence  SKIN:                             no rash  Musculoskeletal:        No joint pain or swelling  Extremities:                No swelling  Neurological:              No headaches  Psychiatric:                 No depression, anxiety    Vital Signs Last 24 Hrs  T(C): 36.3 (01 Aug 2023 04:49), Max: 36.6 (31 Jul 2023 11:13)  T(F): 97.3 (01 Aug 2023 04:49), Max: 97.8 (31 Jul 2023 11:13)  HR: 85 (01 Aug 2023 09:45) (58 - 95)  BP: 113/72 (01 Aug 2023 04:49) (109/73 - 129/73)  BP(mean): --  RR: 16 (01 Aug 2023 04:49) (16 - 20)  SpO2: 94% (01 Aug 2023 09:45) (94% - 99%)    Parameters below as of 01 Aug 2023 06:04  Patient On (Oxygen Delivery Method): nasal cannula, 2    PHYSICAL EXAM:  GEN:         Awake, responsive and comfortable.  HEENT:    Normal.    RESP:      decreased air entrrry  CVS:          Regular rate and rhythm.   ABD:         Soft, non-tender, non-distended;   SKIN:         Warm and dry.  EXTR:            No clubbing, cyanosis or edema  CNS:              Intact sensory and motor function.  PSYCH:        cooperative, no anxiety or depression    LABS:                        12.9   12.65 )-----------( 189      ( 01 Aug 2023 06:20 )             38.6     08-01    141  |  106  |  20  ----------------------------<  108<H>  4.1   |  32<H>  |  0.96    Ca    8.8      01 Aug 2023 06:20  Phos  2.8     08-01  Mg     2.2     08-01    TPro  6.5  /  Alb  3.2<L>  /  TBili  0.6  /  DBili  x   /  AST  17  /  ALT  26  /  AlkPhos  58  08-01 07-31 @ 04:05  pH: --  pCO2: 47  pO2: 77  SaO2: 95.9  07-30 @ 03:04  pH: --  pCO2: 49  pO2: 161  SaO2: 100.0    Urinalysis Basic - ( 01 Aug 2023 06:20 )    Color: x / Appearance: x / SG: x / pH: x  Gluc: 108 mg/dL / Ketone: x  / Bili: x / Urobili: x   Blood: x / Protein: x / Nitrite: x   Leuk Esterase: x / RBC: x / WBC x   Sq Epi: x / Non Sq Epi: x / Bacteria: x    EKG: Sinus tachycardia.    RADIOLOGY & ADDITIONAL STUDIES:  < from: Xray Chest 1 View- PORTABLE-Urgent (07.30.23 @ 03:36) >  ACC: [id number] EXAM:  XR CHEST PORTABLE URGENT 1V   ORDERED BY: KRISTEN VANEGAS     PROCEDURE DATE:  07/30/2023      INTERPRETATION:  TIME OF EXAM: July 30, 2023 at 2:57 AM.    CLINICAL INFORMATION: Chest pain.    COMPARISON:  June 27, 2022.    TECHNIQUE:   AP Portable chest x-ray.    INTERPRETATION:    The heart is not enlarged. Coronary artery stents and/or calcifications   are seen.  There is a left chest wall ICD with intact lead with tip in expected   region of the right ventricle. A rectangular radiopaque device projects   over and obscures part of the left chest.  There is a cluster of subcentimeter nodular opacities in the right apex.  No focal lung consolidation, pleural effusion, or pneumothorax is seen.  No acute bony abnormality is seen.    IMPRESSION:  Cluster of subcentimeter nodular opacities in the right   apex, of indeterminate nature. No focal lung consolidation.    TARIQ KEMP MD; Attending Radiologist  This document has been electronically signed. Jul 31 2023 10:17AM    ASSESSMENT AND PLAN:  ·	Acute Respiratory distress.  ·	Hypoxia  ·	Acute COPD exacerbation.  ·	Stented CAD.  ·	S/P PPM/ICD    SPO2 93% on room air at rest. Use O2 as needed.  Consider CT chest for RUL nodule.  Taper steroid over 5-7 days.  Continue Symbicort, may also resume Spiriva upon discharge.  Patient is a 68y old  Male who presents with a chief complaint of Acute Respiratory Failure with Hypoxia/ Asthma Exacerbation (31 Jul 2023 13:52)    HPI:  68 year old male with COPD on home O2 ( Symbicort, Spiriva and PRN Albuterol ), Asthma, CAD sp stents x 2 with ICD, Esophageal reflex, Chronic Sinusitis, Eosinophilic gastritis.  40+ year smoking, quit 14 years ago  Brought  for several days of productive cough with white sputum that progressively got worst.  This led to  acute onset of SOB worsening productive white sputum.   Was found to be hypoxic saturating in the 80%, on route patient was given Duoneb x2 by EMS.   Upon ED arrival patient was in respiratory distress,  tachypneic with RR 25 & saturating 88% on RA, using accessory muscles.   Got placed on BiPAP,  received Albuterol, duonebs, Magnesium , solumedrol & Ceftriaxone empirically for PNA .  Not sure about trigger but says it may be weather related, or he may have gotten some thing from his grand kids.    PAST MEDICAL & SURGICAL HISTORY:  Asthma (ICD9 493.90)    Emphysema (ICD9 492.8)  COPD    CMC arthritis    Chronic sinusitis    Esophageal reflux    Nasal polyps    Eosinophilic gastritis    S/P Cholecystectomy (ICD9 V45.79)    S/P cataract extraction  both eyes    SOCIAL HISTORY:     Allergies  No Known Allergies    MEDICATIONS  (STANDING):  albuterol    0.083%. 2.5 milliGRAM(s) Nebulizer once  albuterol/ipratropium for Nebulization 3 milliLiter(s) Nebulizer every 6 hours  aspirin  chewable 81 milliGRAM(s) Oral daily  benzonatate 100 milliGRAM(s) Oral three times a day  budesonide 160 MICROgram(s)/formoterol 4.5 MICROgram(s) Inhaler 2 Puff(s) Inhalation two times a day  heparin   Injectable 5000 Unit(s) SubCutaneous every 12 hours  predniSONE   Tablet 40 milliGRAM(s) Oral daily    MEDICATIONS  (PRN):  acetaminophen     Tablet .. 650 milliGRAM(s) Oral every 6 hours PRN Temp greater or equal to 38C (100.4F), Mild Pain (1 - 3)  aluminum hydroxide/magnesium hydroxide/simethicone Suspension 30 milliLiter(s) Oral every 4 hours PRN Dyspepsia  melatonin 3 milliGRAM(s) Oral at bedtime PRN Insomnia  ondansetron Injectable 4 milliGRAM(s) IV Push every 8 hours PRN Nausea and/or Vomiting    REVIEW OF SYSTEMS:    Constitutional:            No fever, weight loss or fatigue  HEENT:                       No difficulty hearing, tinnitus, vertigo; No sinus or throat pain  Respiratory:               SOB and productive cough.  Cardiovascular:           No chest pain, palpitations  Gastrointestinal:        No abdominal or epigastric pain. No N/V/diarrhea or hematemesis  Genitourinary:            No dysuria, frequency, hematuria or incontinence  SKIN:                             no rash  Musculoskeletal:        No joint pain or swelling  Extremities:                No swelling  Neurological:              No headaches  Psychiatric:                 No depression, anxiety    Vital Signs Last 24 Hrs  T(C): 36.3 (01 Aug 2023 04:49), Max: 36.6 (31 Jul 2023 11:13)  T(F): 97.3 (01 Aug 2023 04:49), Max: 97.8 (31 Jul 2023 11:13)  HR: 85 (01 Aug 2023 09:45) (58 - 95)  BP: 113/72 (01 Aug 2023 04:49) (109/73 - 129/73)  BP(mean): --  RR: 16 (01 Aug 2023 04:49) (16 - 20)  SpO2: 94% (01 Aug 2023 09:45) (94% - 99%)    Parameters below as of 01 Aug 2023 06:04  Patient On (Oxygen Delivery Method): nasal cannula, 2    PHYSICAL EXAM:  GEN:         Awake, responsive and comfortable.  HEENT:    Normal.    RESP:      decreased air entrrry  CVS:          Regular rate and rhythm.   ABD:         Soft, non-tender, non-distended;   SKIN:         Warm and dry.  EXTR:            No clubbing, cyanosis or edema  CNS:              Intact sensory and motor function.  PSYCH:        cooperative, no anxiety or depression    LABS:                        12.9   12.65 )-----------( 189      ( 01 Aug 2023 06:20 )             38.6     08-01    141  |  106  |  20  ----------------------------<  108<H>  4.1   |  32<H>  |  0.96    Ca    8.8      01 Aug 2023 06:20  Phos  2.8     08-01  Mg     2.2     08-01    TPro  6.5  /  Alb  3.2<L>  /  TBili  0.6  /  DBili  x   /  AST  17  /  ALT  26  /  AlkPhos  58  08-01 07-31 @ 04:05  pH: --  pCO2: 47  pO2: 77  SaO2: 95.9  07-30 @ 03:04  pH: --  pCO2: 49  pO2: 161  SaO2: 100.0    Urinalysis Basic - ( 01 Aug 2023 06:20 )    Color: x / Appearance: x / SG: x / pH: x  Gluc: 108 mg/dL / Ketone: x  / Bili: x / Urobili: x   Blood: x / Protein: x / Nitrite: x   Leuk Esterase: x / RBC: x / WBC x   Sq Epi: x / Non Sq Epi: x / Bacteria: x    EKG: Sinus tachycardia.    RADIOLOGY & ADDITIONAL STUDIES:  < from: Xray Chest 1 View- PORTABLE-Urgent (07.30.23 @ 03:36) >  ACC: [id number] EXAM:  XR CHEST PORTABLE URGENT 1V   ORDERED BY: KRISTEN VANEGAS     PROCEDURE DATE:  07/30/2023      INTERPRETATION:  TIME OF EXAM: July 30, 2023 at 2:57 AM.    CLINICAL INFORMATION: Chest pain.    COMPARISON:  June 27, 2022.    TECHNIQUE:   AP Portable chest x-ray.    INTERPRETATION:    The heart is not enlarged. Coronary artery stents and/or calcifications   are seen.  There is a left chest wall ICD with intact lead with tip in expected   region of the right ventricle. A rectangular radiopaque device projects   over and obscures part of the left chest.  There is a cluster of subcentimeter nodular opacities in the right apex.  No focal lung consolidation, pleural effusion, or pneumothorax is seen.  No acute bony abnormality is seen.    IMPRESSION:  Cluster of subcentimeter nodular opacities in the right   apex, of indeterminate nature. No focal lung consolidation.    TARIQ KEMP MD; Attending Radiologist  This document has been electronically signed. Jul 31 2023 10:17AM    ASSESSMENT AND PLAN:  ·	Acute Respiratory distress.  ·	Hypoxia  ·	Acute COPD exacerbation.  ·	RUL nodules.  ·	Stented CAD.  ·	S/P PPM/ICD    SPO2 93% on room air at rest. Use O2 as needed.  Consider CT chest for RUL nodule.  Taper steroid over 5-7 days.  Continue Symbicort, may also resume Spiriva upon discharge.

## 2023-08-01 NOTE — PROGRESS NOTE ADULT - SUBJECTIVE AND OBJECTIVE BOX
Patient is a 68y old  Male who presents with a chief complaint of Acute Respiratory Failure with Hypoxia/ Asthma Exacerbation (01 Aug 2023 10:59)    INTERVAL HPI/OVERNIGHT EVENTS:    MEDICATIONS  (STANDING):  albuterol    0.083%. 2.5 milliGRAM(s) Nebulizer once  albuterol/ipratropium for Nebulization 3 milliLiter(s) Nebulizer every 6 hours  aspirin  chewable 81 milliGRAM(s) Oral daily  budesonide 160 MICROgram(s)/formoterol 4.5 MICROgram(s) Inhaler 2 Puff(s) Inhalation two times a day  heparin   Injectable 5000 Unit(s) SubCutaneous every 12 hours  predniSONE   Tablet 40 milliGRAM(s) Oral daily    MEDICATIONS  (PRN):  acetaminophen     Tablet .. 650 milliGRAM(s) Oral every 6 hours PRN Temp greater or equal to 38C (100.4F), Mild Pain (1 - 3)  aluminum hydroxide/magnesium hydroxide/simethicone Suspension 30 milliLiter(s) Oral every 4 hours PRN Dyspepsia  melatonin 3 milliGRAM(s) Oral at bedtime PRN Insomnia  ondansetron Injectable 4 milliGRAM(s) IV Push every 8 hours PRN Nausea and/or Vomiting    Allergies    No Known Allergies    Intolerances      REVIEW OF SYSTEMS:  All other systems reviewed and are negative    Vital Signs Last 24 Hrs  T(C): 36.6 (01 Aug 2023 10:57), Max: 36.6 (01 Aug 2023 10:57)  T(F): 97.9 (01 Aug 2023 10:57), Max: 97.9 (01 Aug 2023 10:57)  HR: 73 (01 Aug 2023 11:42) (58 - 95)  BP: 132/72 (01 Aug 2023 10:57) (109/73 - 132/72)  BP(mean): --  RR: 18 (01 Aug 2023 10:57) (16 - 19)  SpO2: 94% (01 Aug 2023 11:42) (94% - 99%)    Parameters below as of 01 Aug 2023 11:42  Patient On (Oxygen Delivery Method): room air      Daily     Daily   I&O's Summary    31 Jul 2023 07:01  -  01 Aug 2023 07:00  --------------------------------------------------------  IN: 480 mL / OUT: 0 mL / NET: 480 mL      CAPILLARY BLOOD GLUCOSE        PHYSICAL EXAM:  GENERAL: NAD, well-groomed, well-developed  HEAD:  Atraumatic, Normocephalic  EYES: EOMI, PERRLA, conjunctiva and sclera clear  ENMT: No tonsillar erythema, exudates, or enlargement; Moist mucous membranes, Good dentition, No lesions  NECK: Supple, No JVD, Normal thyroid  NERVOUS SYSTEM:  Alert & Oriented X3, Good concentration; Motor Strength 5/5 B/L upper and lower extremities; DTRs 2+ intact and symmetric  CHEST/LUNG: Clear to percussion bilaterally; No rales, rhonchi, wheezing, or rubs  HEART: Regular rate and rhythm; No murmurs, rubs, or gallops  ABDOMEN: Soft, Nontender, Nondistended; Bowel sounds present  EXTREMITIES:  2+ Peripheral Pulses, No clubbing, cyanosis, or edema  LYMPH: No lymphadenopathy noted  SKIN: No rashes or lesions    Labs                          12.9   12.65 )-----------( 189      ( 01 Aug 2023 06:20 )             38.6     08-01    141  |  106  |  20  ----------------------------<  108<H>  4.1   |  32<H>  |  0.96    Ca    8.8      01 Aug 2023 06:20  Phos  2.8     08-01  Mg     2.2     08-01    TPro  6.5  /  Alb  3.2<L>  /  TBili  0.6  /  DBili  x   /  AST  17  /  ALT  26  /  AlkPhos  58  08-01          Urinalysis Basic - ( 01 Aug 2023 06:20 )    Color: x / Appearance: x / SG: x / pH: x  Gluc: 108 mg/dL / Ketone: x  / Bili: x / Urobili: x   Blood: x / Protein: x / Nitrite: x   Leuk Esterase: x / RBC: x / WBC x   Sq Epi: x / Non Sq Epi: x / Bacteria: x               Patient is a 68y old  Male who presents with a chief complaint of Acute Respiratory Failure with Hypoxia/ Asthma Exacerbation (01 Aug 2023 10:59)    INTERVAL HPI/OVERNIGHT EVENTS: no acute events  SUBJECTIVE: reports still breathing better but not back to normal yet     MEDICATIONS  (STANDING):  albuterol    0.083%. 2.5 milliGRAM(s) Nebulizer once  albuterol/ipratropium for Nebulization 3 milliLiter(s) Nebulizer every 6 hours  aspirin  chewable 81 milliGRAM(s) Oral daily  budesonide 160 MICROgram(s)/formoterol 4.5 MICROgram(s) Inhaler 2 Puff(s) Inhalation two times a day  heparin   Injectable 5000 Unit(s) SubCutaneous every 12 hours  predniSONE   Tablet 40 milliGRAM(s) Oral daily    MEDICATIONS  (PRN):  acetaminophen     Tablet .. 650 milliGRAM(s) Oral every 6 hours PRN Temp greater or equal to 38C (100.4F), Mild Pain (1 - 3)  aluminum hydroxide/magnesium hydroxide/simethicone Suspension 30 milliLiter(s) Oral every 4 hours PRN Dyspepsia  melatonin 3 milliGRAM(s) Oral at bedtime PRN Insomnia  ondansetron Injectable 4 milliGRAM(s) IV Push every 8 hours PRN Nausea and/or Vomiting    Allergies    No Known Allergies    Intolerances      REVIEW OF SYSTEMS:  All other systems reviewed and are negative    Vital Signs Last 24 Hrs  T(C): 36.6 (01 Aug 2023 10:57), Max: 36.6 (01 Aug 2023 10:57)  T(F): 97.9 (01 Aug 2023 10:57), Max: 97.9 (01 Aug 2023 10:57)  HR: 73 (01 Aug 2023 11:42) (58 - 95)  BP: 132/72 (01 Aug 2023 10:57) (109/73 - 132/72)  BP(mean): --  RR: 18 (01 Aug 2023 10:57) (16 - 19)  SpO2: 94% (01 Aug 2023 11:42) (94% - 99%)    Parameters below as of 01 Aug 2023 11:42  Patient On (Oxygen Delivery Method): room air      Daily     Daily   I&O's Summary    31 Jul 2023 07:01  -  01 Aug 2023 07:00  --------------------------------------------------------  IN: 480 mL / OUT: 0 mL / NET: 480 mL      CAPILLARY BLOOD GLUCOSE        PHYSICAL EXAM:  GENERAL: NAD, well-groomed, well-developed  HEAD:  Atraumatic, Normocephalic  CHEST/LUNG: diffuse wheezing and rhonchi. comfortable  HEART: Regular rate and rhythm; No murmurs, rubs, or gallops  ABDOMEN: Soft, Nontender, Nondistended; Bowel sounds present    Labs                          12.9   12.65 )-----------( 189      ( 01 Aug 2023 06:20 )             38.6     08-01    141  |  106  |  20  ----------------------------<  108<H>  4.1   |  32<H>  |  0.96    Ca    8.8      01 Aug 2023 06:20  Phos  2.8     08-01  Mg     2.2     08-01    TPro  6.5  /  Alb  3.2<L>  /  TBili  0.6  /  DBili  x   /  AST  17  /  ALT  26  /  AlkPhos  58  08-01          Urinalysis Basic - ( 01 Aug 2023 06:20 )    Color: x / Appearance: x / SG: x / pH: x  Gluc: 108 mg/dL / Ketone: x  / Bili: x / Urobili: x   Blood: x / Protein: x / Nitrite: x   Leuk Esterase: x / RBC: x / WBC x   Sq Epi: x / Non Sq Epi: x / Bacteria: x

## 2023-08-01 NOTE — PROGRESS NOTE ADULT - ASSESSMENT
67 yo male w/ pmhx of COPD, asthma, CAD spx 2 stents with AICD admitted to [facility] for acute hypoxemic respiratory failure 2/2 COPD/asthma exacerbation     PULMNOLOGY  # COPD  # asthma  # asthma/copd exacerbation  # acute hypoxemic respiratory failure- resolved   - breathing better than when he came in  7/31- sp02 ~ > 90% on room air  8/1- spo2 ~ 92% on room air   - prednisone, zithromax    CARDIOLOGY  # hx of cad  # HF w/ ReF?    PLAN  - c/w [facility]  - albuterol neb, prednisone, singulair, symbicort, zithromax  - walking saturations   - CT chest for pulmonary nodules   - possible DC in the morning   69 yo male w/ pmhx of COPD, asthma, CAD spx 2 stents with AICD admitted to [facility] for acute hypoxemic respiratory failure 2/2 COPD/asthma exacerbation     PULMNOLOGY  # COPD  # asthma  # asthma/copd exacerbation  # acute hypoxemic respiratory failure- resolved   - breathing better than when he came in  7/31- sp02 ~ > 90% on room air  8/1- spo2 ~ 92% on room air   - prednisone, zithromax    CARDIOLOGY  # hx of cad  # HF w/ ReF?    PLAN  - c/w [facility]  - albuterol neb, prednisone, singulair, symbicort, zithromax  - walking saturations   - CT chest for pulmonary nodules   - possible DC in the morning, need walking sats    67 yo male w/ pmhx of COPD, asthma, CAD spx 2 stents with AICD admitted to [facility] for acute hypoxemic respiratory failure 2/2 COPD/asthma exacerbation     PULMNOLOGY  # COPD  # asthma  # asthma/copd exacerbation  # acute hypoxemic respiratory failure- resolved   - breathing better than when he came in  7/31- sp02 ~ > 90% on room air  8/1- spo2 ~ 92% on room air   - prednisone, zithromax day # 3/5    CARDIOLOGY  # hx of cad  # HF w/ ReF?    PLAN  - c/w [facility]  - albuterol neb, prednisone, singulair, symbicort, zithromax  - walking saturations   - CT chest for pulmonary nodules   - possible DC in the morning, need walking sats

## 2023-08-02 PROCEDURE — 71250 CT THORAX DX C-: CPT | Mod: 26

## 2023-08-02 PROCEDURE — 99232 SBSQ HOSP IP/OBS MODERATE 35: CPT

## 2023-08-02 RX ORDER — SACUBITRIL AND VALSARTAN 24; 26 MG/1; MG/1
1 TABLET, FILM COATED ORAL
Refills: 0 | DISCHARGE

## 2023-08-02 RX ORDER — ACETYLCYSTEINE 200 MG/ML
4 VIAL (ML) MISCELLANEOUS EVERY 6 HOURS
Refills: 0 | Status: DISCONTINUED | OUTPATIENT
Start: 2023-08-02 | End: 2023-08-03

## 2023-08-02 RX ORDER — TAMSULOSIN HYDROCHLORIDE 0.4 MG/1
1 CAPSULE ORAL
Refills: 0 | DISCHARGE

## 2023-08-02 RX ADMIN — BUDESONIDE AND FORMOTEROL FUMARATE DIHYDRATE 2 PUFF(S): 160; 4.5 AEROSOL RESPIRATORY (INHALATION) at 17:19

## 2023-08-02 RX ADMIN — Medication 4 MILLILITER(S): at 23:02

## 2023-08-02 RX ADMIN — Medication 3 MILLILITER(S): at 23:01

## 2023-08-02 RX ADMIN — Medication 3 MILLILITER(S): at 18:10

## 2023-08-02 RX ADMIN — Medication 81 MILLIGRAM(S): at 12:06

## 2023-08-02 RX ADMIN — Medication 3 MILLILITER(S): at 11:34

## 2023-08-02 RX ADMIN — HEPARIN SODIUM 5000 UNIT(S): 5000 INJECTION INTRAVENOUS; SUBCUTANEOUS at 17:22

## 2023-08-02 RX ADMIN — BUDESONIDE AND FORMOTEROL FUMARATE DIHYDRATE 2 PUFF(S): 160; 4.5 AEROSOL RESPIRATORY (INHALATION) at 05:32

## 2023-08-02 RX ADMIN — Medication 40 MILLIGRAM(S): at 05:31

## 2023-08-02 RX ADMIN — Medication 3 MILLILITER(S): at 05:05

## 2023-08-02 RX ADMIN — HEPARIN SODIUM 5000 UNIT(S): 5000 INJECTION INTRAVENOUS; SUBCUTANEOUS at 05:32

## 2023-08-02 NOTE — PROGRESS NOTE ADULT - SUBJECTIVE AND OBJECTIVE BOX
Patient is a 68y old  Male who presents with a chief complaint of Acute Respiratory Failure with Hypoxia/ Asthma Exacerbation (01 Aug 2023 16:05)      INTERVAL HPI/OVERNIGHT EVENTS:  Pt was seen and examined, no acute events.      MEDICATIONS  (STANDING):  albuterol/ipratropium for Nebulization 3 milliLiter(s) Nebulizer every 6 hours  aspirin  chewable 81 milliGRAM(s) Oral daily  budesonide 160 MICROgram(s)/formoterol 4.5 MICROgram(s) Inhaler 2 Puff(s) Inhalation two times a day  heparin   Injectable 5000 Unit(s) SubCutaneous every 12 hours  predniSONE   Tablet 40 milliGRAM(s) Oral daily    MEDICATIONS  (PRN):  acetaminophen     Tablet .. 650 milliGRAM(s) Oral every 6 hours PRN Temp greater or equal to 38C (100.4F), Mild Pain (1 - 3)  aluminum hydroxide/magnesium hydroxide/simethicone Suspension 30 milliLiter(s) Oral every 4 hours PRN Dyspepsia  melatonin 3 milliGRAM(s) Oral at bedtime PRN Insomnia  ondansetron Injectable 4 milliGRAM(s) IV Push every 8 hours PRN Nausea and/or Vomiting      Allergies  No Known Allergies        Vital Signs Last 24 Hrs  T(C): 36.5 (02 Aug 2023 11:08), Max: 36.5 (01 Aug 2023 23:38)  T(F): 97.7 (02 Aug 2023 11:08), Max: 97.7 (01 Aug 2023 23:38)  HR: 74 (02 Aug 2023 12:19) (57 - 87)  BP: 110/68 (02 Aug 2023 11:08) (110/68 - 129/71)  BP(mean): --  RR: 18 (02 Aug 2023 11:08) (18 - 18)  SpO2: 94% (02 Aug 2023 12:19) (93% - 100%)    Parameters below as of 02 Aug 2023 12:19  Patient On (Oxygen Delivery Method): room air        PHYSICAL EXAM:  GENERAL: NAD, well-groomed, well-developed  HEAD:  Atraumatic, Normocephalic  EYES: EOMI, PERRLA, conjunctiva and sclera clear  ENMT: No tonsillar erythema, exudates, or enlargement; Moist mucous membranes, Good dentition, No lesions  NECK: Supple, No JVD, Normal thyroid  NERVOUS SYSTEM:  Alert & Oriented X3, Good concentration; Motor Strength 5/5 B/L upper and lower extremities; DTRs 2+ intact and symmetric  CHEST/LUNG: Clear to percussion bilaterally; No rales, rhonchi, wheezing, or rubs  HEART: Regular rate and rhythm; No murmurs, rubs, or gallops  ABDOMEN: Soft, Nontender, Nondistended; Bowel sounds present  EXTREMITIES:  2+ Peripheral Pulses, No clubbing, cyanosis, or edema  LYMPH: No lymphadenopathy noted  SKIN: No rashes or lesions          LABS:                        12.9   12.65 )-----------( 189      ( 01 Aug 2023 06:20 )             38.6     08-01    141  |  106  |  20  ----------------------------<  108<H>  4.1   |  32<H>  |  0.96    Ca    8.8      01 Aug 2023 06:20  Phos  2.8     08-01  Mg     2.2     08-01    TPro  6.5  /  Alb  3.2<L>  /  TBili  0.6  /  DBili  x   /  AST  17  /  ALT  26  /  AlkPhos  58  08-01      Urinalysis Basic - ( 01 Aug 2023 06:20 )    Color: x / Appearance: x / SG: x / pH: x  Gluc: 108 mg/dL / Ketone: x  / Bili: x / Urobili: x   Blood: x / Protein: x / Nitrite: x   Leuk Esterase: x / RBC: x / WBC x   Sq Epi: x / Non Sq Epi: x / Bacteria: x      CAPILLARY BLOOD GLUCOSE          RADIOLOGY & ADDITIONAL TESTS:    Imaging Personally Reviewed:  [ ] YES  [ ] NO    Consultant(s) Notes Reviewed:  [ ] YES  [ ] NO    Care Discussed with Consultants/Other Providers [ ] YES  [ ] NO

## 2023-08-02 NOTE — PROGRESS NOTE ADULT - ASSESSMENT
69 yo male w/ pmhx of COPD, asthma, CAD spx 2 stents with AICD admitted to [facility] for acute hypoxemic respiratory failure 2/2 COPD/asthma exacerbation     PULMNOLOGY  # COPD  # asthma  # asthma/copd exacerbation  # acute hypoxemic respiratory failure- resolved   - breathing better than when he came in  Improving, on RA  Pt has home O2  Need of BIPAP to be determined  CT chest pending   - prednisone, s/p 3 days of zithromax    CARDIOLOGY  # hx of cad  # HF w/ ReF?    DC pending CT chest

## 2023-08-02 NOTE — PROGRESS NOTE ADULT - SUBJECTIVE AND OBJECTIVE BOX
INTERVAL HPI:  68 year old male with COPD on home O2 ( Symbicort, Spiriva and PRN Albuterol ), Asthma, CAD sp stents x 2 with ICD, Esophageal reflex, Chronic Sinusitis, Eosinophilic gastritis.  40+ year smoking, quit 14 years ago  Brought  for several days of productive cough with white sputum that progressively got worst.  This led to  acute onset of SOB worsening productive white sputum.   Was found to be hypoxic saturating in the 80%, on route patient was given Duoneb x2 by EMS.   Upon ED arrival patient was in respiratory distress,  tachypneic with RR 25 & saturating 88% on RA, using accessory muscles.   Got placed on BiPAP,  received Albuterol, duonebs, Magnesium , solumedrol & Ceftriaxone empirically for PNA .  Not sure about trigger but says it may be weather related, or he may have gotten some thing from his grand kids.    OVERNIGHT EVENTS: Feeling some what better but reports cough and wheezing at times.     Vital Signs Last 24 Hrs  T(C): 36.5 (02 Aug 2023 16:50), Max: 36.5 (01 Aug 2023 23:38)  T(F): 97.7 (02 Aug 2023 16:50), Max: 97.7 (01 Aug 2023 23:38)  HR: 68 (02 Aug 2023 16:50) (57 - 87)  BP: 126/78 (02 Aug 2023 16:50) (110/68 - 129/71)  BP(mean): --  RR: 18 (02 Aug 2023 16:50) (18 - 18)  SpO2: 95% (02 Aug 2023 16:50) (93% - 100%)    Parameters below as of 02 Aug 2023 16:50  Patient On (Oxygen Delivery Method): room air    PHYSICAL EXAM:  GEN:         Awake, responsive and comfortable.  HEENT:    Normal.    RESP:      Decreased air entry.  CVS:        Regular rate and rhythm.     MEDICATIONS  (STANDING):  albuterol/ipratropium for Nebulization 3 milliLiter(s) Nebulizer every 6 hours  aspirin  chewable 81 milliGRAM(s) Oral daily  budesonide 160 MICROgram(s)/formoterol 4.5 MICROgram(s) Inhaler 2 Puff(s) Inhalation two times a day  heparin   Injectable 5000 Unit(s) SubCutaneous every 12 hours  predniSONE   Tablet 40 milliGRAM(s) Oral daily    MEDICATIONS  (PRN):  acetaminophen     Tablet .. 650 milliGRAM(s) Oral every 6 hours PRN Temp greater or equal to 38C (100.4F), Mild Pain (1 - 3)  aluminum hydroxide/magnesium hydroxide/simethicone Suspension 30 milliLiter(s) Oral every 4 hours PRN Dyspepsia  melatonin 3 milliGRAM(s) Oral at bedtime PRN Insomnia  ondansetron Injectable 4 milliGRAM(s) IV Push every 8 hours PRN Nausea and/or Vomiting    LABS:                        12.9   12.65 )-----------( 189      ( 01 Aug 2023 06:20 )             38.6     08-01    141  |  106  |  20  ----------------------------<  108<H>  4.1   |  32<H>  |  0.96    Ca    8.8      01 Aug 2023 06:20  Phos  2.8     08-01  Mg     2.2     08-01    TPro  6.5  /  Alb  3.2<L>  /  TBili  0.6  /  DBili  x   /  AST  17  /  ALT  26  /  AlkPhos  58  08-01    07-31 @ 04:05  pH: --  pCO2: 47  pO2: 77  SaO2: 95.9  07-30 @ 03:04  pH: --  pCO2: 49  pO2: 161  SaO2: 100.0    Urinalysis Basic - ( 01 Aug 2023 06:20 )    Color: x / Appearance: x / SG: x / pH: x  Gluc: 108 mg/dL / Ketone: x  / Bili: x / Urobili: x   Blood: x / Protein: x / Nitrite: x   Leuk Esterase: x / RBC: x / WBC x   Sq Epi: x / Non Sq Epi: x / Bacteria: x  < from: CT Chest No Cont (08.02.23 @ 17:35) >  ACC: [id number] EXAM:  CT CHEST   ORDERED BY: PINA MCBRIDE     PROCEDURE DATE:  08/02/2023      INTERPRETATION:  EXAMINATION: CT CHEST    CLINICAL INDICATION: Lung nodules.    TECHNIQUE: Noncontrast CT of the chest was obtained.    COMPARISON: 10.25.18.    FINDINGS:    AIRWAYS AND LUNGS: The central tracheobronchial tree is patent.    Evaluation limited secondary to respiratory motion. A few areas of   bronchiectasis. Scattered mucoid impaction and tubular/branching   opacities. Right lower lobethick linear opacity. Bilateral blebs and   bulla are redemonstrated. Emphysema. Other smaller nodules measuring up   to 3 mm are not well evaluated.    MEDIASTINUM AND PLEURA: There are no enlarged mediastinal, hilar or   axillary lymph nodes. The visualized portion of the thyroid gland is   unremarkable. There is no pleural effusion. There is no pneumothorax.    HEART AND VESSELS: There is mild cardiomegaly.  There are atherosclerotic   calcifications of the aorta and coronary arteries.  There is no   pericardial effusion.  Left-sided defibrillator.    UPPER ABDOMEN: Images of the upper abdomen demonstrate cholecystectomy.    BONES AND SOFT TISSUES: The bones are unremarkable.  The soft tissues are   unremarkable.    IMPRESSION:  Evaluationof the lungs limited secondary to respiratory motion. A few   areas of bronchiectasis. Scattered mucoid impaction and tubular/branching   opacities which likely represent mucoid impaction. Right lower lobe thick   linear opacity may represent atelectasis.  Other smaller nodules   measuring up to 3 mm are not well evaluated. Continued follow-up chest CT   recommended for complete evaluation.    ANI RICKS MD; Attending Radiologist  This document has been electronically signed. Aug  2 2023  5:47PM    ASSESSMENT AND PLAN:  Acute Respiratory distress.  Hypoxia  Acute COPD exacerbation.  Bronchiectasis with mucoid impaction.  Stented CAD.  S/P PPM/ICD    SPO2 93% on room air at rest. Use O2 as needed.  Consider CT chest for RUL nodule.  Taper steroid over 5-7 days.  Continue Symbicort, may also resume Spiriva upon discharge.    08/02/23: Feeling some what better but reports cough and wheezing at times.   CT chest shows bronchiectasis with mucoid impaction.  Will add Mucomyst with nebulizer, followed by chest PT.

## 2023-08-03 ENCOUNTER — TRANSCRIPTION ENCOUNTER (OUTPATIENT)
Age: 68
End: 2023-08-03

## 2023-08-03 VITALS
OXYGEN SATURATION: 98 % | RESPIRATION RATE: 17 BRPM | DIASTOLIC BLOOD PRESSURE: 74 MMHG | HEART RATE: 69 BPM | TEMPERATURE: 99 F | SYSTOLIC BLOOD PRESSURE: 122 MMHG

## 2023-08-03 LAB
BASE EXCESS BLDA CALC-SCNC: 4.5 MMOL/L — HIGH (ref -2–3)
BLOOD GAS COMMENTS ARTERIAL: SIGNIFICANT CHANGE UP
CO2 BLDA-SCNC: 30 MMOL/L — HIGH (ref 19–24)
GAS PNL BLDA: SIGNIFICANT CHANGE UP
HCO3 BLDA-SCNC: 28 MMOL/L — SIGNIFICANT CHANGE UP (ref 21–28)
HCT VFR BLD CALC: 41.6 % — SIGNIFICANT CHANGE UP (ref 39–50)
HGB BLD-MCNC: 13.4 G/DL — SIGNIFICANT CHANGE UP (ref 13–17)
HOROWITZ INDEX BLDA+IHG-RTO: 21 — SIGNIFICANT CHANGE UP
MCHC RBC-ENTMCNC: 30.7 PG — SIGNIFICANT CHANGE UP (ref 27–34)
MCHC RBC-ENTMCNC: 32.2 G/DL — SIGNIFICANT CHANGE UP (ref 32–36)
MCV RBC AUTO: 95.4 FL — SIGNIFICANT CHANGE UP (ref 80–100)
NRBC # BLD: 0 /100 WBCS — SIGNIFICANT CHANGE UP (ref 0–0)
PCO2 BLDA: 39 MMHG — SIGNIFICANT CHANGE UP (ref 32–46)
PH BLDA: 7.47 — HIGH (ref 7.35–7.45)
PLATELET # BLD AUTO: 141 K/UL — LOW (ref 150–400)
PO2 BLDA: 75 MMHG — LOW (ref 83–108)
RBC # BLD: 4.36 M/UL — SIGNIFICANT CHANGE UP (ref 4.2–5.8)
RBC # FLD: 13.5 % — SIGNIFICANT CHANGE UP (ref 10.3–14.5)
SAO2 % BLDA: 96.1 % — SIGNIFICANT CHANGE UP (ref 94–98)
WBC # BLD: 11.53 K/UL — HIGH (ref 3.8–10.5)
WBC # FLD AUTO: 11.53 K/UL — HIGH (ref 3.8–10.5)

## 2023-08-03 PROCEDURE — 99239 HOSP IP/OBS DSCHRG MGMT >30: CPT

## 2023-08-03 RX ORDER — FAMOTIDINE 10 MG/ML
1 INJECTION INTRAVENOUS
Refills: 0 | DISCHARGE

## 2023-08-03 RX ORDER — SACUBITRIL AND VALSARTAN 24; 26 MG/1; MG/1
1 TABLET, FILM COATED ORAL
Refills: 0 | DISCHARGE

## 2023-08-03 RX ORDER — ACETYLCYSTEINE 200 MG/ML
4 VIAL (ML) MISCELLANEOUS
Qty: 480 | Refills: 0
Start: 2023-08-03 | End: 2023-09-01

## 2023-08-03 RX ORDER — PANTOPRAZOLE SODIUM 20 MG/1
1 TABLET, DELAYED RELEASE ORAL
Qty: 15 | Refills: 0
Start: 2023-08-03 | End: 2023-08-17

## 2023-08-03 RX ORDER — BUDESONIDE AND FORMOTEROL FUMARATE DIHYDRATE 160; 4.5 UG/1; UG/1
2 AEROSOL RESPIRATORY (INHALATION)
Qty: 1 | Refills: 0
Start: 2023-08-03 | End: 2023-09-01

## 2023-08-03 RX ORDER — BUDESONIDE AND FORMOTEROL FUMARATE DIHYDRATE 160; 4.5 UG/1; UG/1
2 AEROSOL RESPIRATORY (INHALATION)
Refills: 0 | DISCHARGE

## 2023-08-03 RX ORDER — TAMSULOSIN HYDROCHLORIDE 0.4 MG/1
1 CAPSULE ORAL
Refills: 0 | DISCHARGE

## 2023-08-03 RX ORDER — ALBUTEROL 90 UG/1
3 AEROSOL, METERED ORAL
Qty: 360 | Refills: 0
Start: 2023-08-03 | End: 2023-09-01

## 2023-08-03 RX ORDER — ALBUTEROL 90 UG/1
3 AEROSOL, METERED ORAL
Refills: 0 | DISCHARGE

## 2023-08-03 RX ADMIN — Medication 3 MILLILITER(S): at 11:16

## 2023-08-03 RX ADMIN — HEPARIN SODIUM 5000 UNIT(S): 5000 INJECTION INTRAVENOUS; SUBCUTANEOUS at 06:00

## 2023-08-03 RX ADMIN — Medication 40 MILLIGRAM(S): at 06:00

## 2023-08-03 RX ADMIN — Medication 4 MILLILITER(S): at 11:16

## 2023-08-03 RX ADMIN — Medication 3 MILLILITER(S): at 05:18

## 2023-08-03 RX ADMIN — BUDESONIDE AND FORMOTEROL FUMARATE DIHYDRATE 2 PUFF(S): 160; 4.5 AEROSOL RESPIRATORY (INHALATION) at 06:04

## 2023-08-03 RX ADMIN — Medication 4 MILLILITER(S): at 05:18

## 2023-08-03 RX ADMIN — Medication 81 MILLIGRAM(S): at 12:00

## 2023-08-03 NOTE — PROGRESS NOTE ADULT - SUBJECTIVE AND OBJECTIVE BOX
INTERVAL HPI:  68 year old male with COPD on home O2 ( Symbicort, Spiriva and PRN Albuterol ), Asthma, CAD sp stents x 2 with ICD, Esophageal reflex, Chronic Sinusitis, Eosinophilic gastritis.  40+ year smoking, quit 14 years ago  Brought  for several days of productive cough with white sputum that progressively got worst.  This led to  acute onset of SOB worsening productive white sputum.   Was found to be hypoxic saturating in the 80%, on route patient was given Duoneb x2 by EMS.   Upon ED arrival patient was in respiratory distress,  tachypneic with RR 25 & saturating 88% on RA, using accessory muscles.   Got placed on BiPAP,  received Albuterol, duonebs, Magnesium , solumedrol & Ceftriaxone empirically for PNA .  Not sure about trigger but says it may be weather related, or he may have gotten some thing from his grand kids.    OVERNIGHT EVENTS:  Awake responsive, comfortable and feels better.    Vital Signs Last 24 Hrs  T(C): 36.4 (03 Aug 2023 05:27), Max: 36.5 (02 Aug 2023 11:08)  T(F): 97.5 (03 Aug 2023 05:27), Max: 97.7 (02 Aug 2023 11:08)  HR: 68 (03 Aug 2023 08:43) (67 - 86)  BP: 118/82 (03 Aug 2023 05:27) (110/68 - 126/78)  BP(mean): --  RR: 18 (03 Aug 2023 05:27) (18 - 18)  SpO2: 94% (03 Aug 2023 08:43) (93% - 100%)    Parameters below as of 03 Aug 2023 06:15  Patient On (Oxygen Delivery Method): room air    PHYSICAL EXAM:  GEN:         Awake, responsive and comfortable.  HEENT:    Normal.    RESP:       decreased air entry  CVS:          Regular rate and rhythm.   ABD:         Soft, non-tender, non-distended;     MEDICATIONS  (STANDING):  acetylcysteine 10%  Inhalation 4 milliLiter(s) Inhalation every 6 hours  albuterol/ipratropium for Nebulization 3 milliLiter(s) Nebulizer every 6 hours  aspirin  chewable 81 milliGRAM(s) Oral daily  budesonide 160 MICROgram(s)/formoterol 4.5 MICROgram(s) Inhaler 2 Puff(s) Inhalation two times a day  heparin   Injectable 5000 Unit(s) SubCutaneous every 12 hours  predniSONE   Tablet 40 milliGRAM(s) Oral daily    MEDICATIONS  (PRN):  acetaminophen     Tablet .. 650 milliGRAM(s) Oral every 6 hours PRN Temp greater or equal to 38C (100.4F), Mild Pain (1 - 3)  aluminum hydroxide/magnesium hydroxide/simethicone Suspension 30 milliLiter(s) Oral every 4 hours PRN Dyspepsia  melatonin 3 milliGRAM(s) Oral at bedtime PRN Insomnia  ondansetron Injectable 4 milliGRAM(s) IV Push every 8 hours PRN Nausea and/or Vomiting    LABS:                        13.4   11.53 )-----------( 141      ( 03 Aug 2023 06:28 )             41.6   07-31 @ 04:05  pH: --  pCO2: 47  pO2: 77  SaO2: 95.9  07-30 @ 03:04  pH: --  pCO2: 49  pO2: 161  SaO2: 100.0    ASSESSMENT AND PLAN:  Acute Respiratory distress.  Hypoxia  Acute COPD exacerbation.  Bronchiectasis with mucoid impaction.  Stented CAD.  S/P PPM/ICD    SPO2 93% on room air at rest. Use O2 as needed.  Consider CT chest for RUL nodule.  Taper steroid over 5-7 days.  Continue Symbicort, may also resume Spiriva upon discharge.    08/02/23: Feeling some what better but reports cough and wheezing at times.   CT chest shows bronchiectasis with mucoid impaction.  Will add Mucomyst with nebulizer, followed by chest PT.    08/03/23: Awake responsive, comfortable and feels better. SPO2 94% on room air.  Using nocturnal BiPAP here and asking if he can get one for home. Will get RA ABG to asses base line hypercarbia.  Continue prednisone with gradual taper over 5-7 days.  Continue nebulizer with Mucomyst followed by chest PT.

## 2023-08-03 NOTE — PROGRESS NOTE ADULT - REASON FOR ADMISSION
Acute Respiratory Failure with Hypoxia/ Asthma Exacerbation

## 2023-08-03 NOTE — DISCHARGE NOTE NURSING/CASE MANAGEMENT/SOCIAL WORK - NSDCPEFALRISK_GEN_ALL_CORE
For information on Fall & Injury Prevention, visit: [email or web address] OR  [email or web address] OR  https://www.cdc.gov/steadi/patient.html

## 2023-08-03 NOTE — DISCHARGE NOTE PROVIDER - NSDCCPCAREPLAN_GEN_ALL_CORE_FT
PRINCIPAL DISCHARGE DIAGNOSIS  Diagnosis: COPD exacerbation  Assessment and Plan of Treatment: 67 yo male w/ pmhx of COPD, asthma, CAD spx 2 stents with AICD admitted to [facility] for acute hypoxemic respiratory failure 2/2 COPD/asthma exacerbation   PULMNOLOGY  # COPD  # asthma  # asthma/copd exacerbation, CT chest with bronchiectases   # acute hypoxemic respiratory failure- resolved   Improving, on RA  Pt has home O2  Mucomyst with neb  - prednisone, s/p 3 days of Zithromax, will dc on medrol dose pack   CARDIOLOGY  # hx of cad  # HF w/ ReF?  Volume status stable  continue home meds, asa/ statin. Entresto, BB

## 2023-08-03 NOTE — DISCHARGE NOTE PROVIDER - HOSPITAL COURSE
69 yo male w/ pmhx of COPD, asthma, CAD spx 2 stents with AICD admitted [facility] for acute hypoxemic respiratory failure 2/2 COPD/asthma exacerbation     PULMNOLOGY  # COPD  # asthma  # asthma/copd exacerbation, CT chest with bronchiectases   # acute hypoxemic respiratory failure- resolved   Improving, on RA  Pt has home O2  Mucomyst with neb  - prednisone, s/p 3 days of Zithromax, will dc on medrol dose pack     CARDIOLOGY  # hx of cad, continue home meds, asa/ statin. Entresto, BB  # HF w/ ReF?  Volume status stable

## 2023-08-03 NOTE — DISCHARGE NOTE PROVIDER - ATTENDING DISCHARGE PHYSICAL EXAMINATION:
Vital Signs Last 24 Hrs  T(C): 37.1 (03 Aug 2023 11:26), Max: 37.1 (03 Aug 2023 11:26)  T(F): 98.8 (03 Aug 2023 11:26), Max: 98.8 (03 Aug 2023 11:26)  HR: 68 (03 Aug 2023 11:26) (67 - 86)  BP: 122/74 (03 Aug 2023 11:26) (114/74 - 126/78)  BP(mean): --  RR: 17 (03 Aug 2023 11:26) (17 - 18)  SpO2: 95% (03 Aug 2023 11:26) (94% - 100%)    Parameters below as of 03 Aug 2023 11:26  Patient On (Oxygen Delivery Method): room air    GENERAL: NAD, well-groomed, well-developed  HEAD:  Atraumatic, Normocephalic  EYES: EOMI, PERRLA, conjunctiva and sclera clear  ENMT: No tonsillar erythema, exudates, or enlargement; Moist mucous membranes, Good dentition, No lesions  NECK: Supple, No JVD, Normal thyroid  NERVOUS SYSTEM:  Alert & Oriented X3, Good concentration; Motor Strength 5/5 B/L upper and lower extremities; DTRs 2+ intact and symmetric  CHEST/LUNG: Clear to percussion bilaterally; No rales, rhonchi, wheezing, or rubs  HEART: Regular rate and rhythm; No murmurs, rubs, or gallops  ABDOMEN: Soft, Nontender, Nondistended; Bowel sounds present  EXTREMITIES:  2+ Peripheral Pulses, No clubbing, cyanosis, or edema  LYMPH: No lymphadenopathy noted  SKIN: No rashes or lesions

## 2023-08-03 NOTE — DISCHARGE NOTE NURSING/CASE MANAGEMENT/SOCIAL WORK - PATIENT PORTAL LINK FT
You can access the FollowMyHealth Patient Portal offered by [facility] by registering at the following website: [email or web address]. By joining [facility]’s FollowMyHealth portal, you will also be able to view your health information using other applications (apps) compatible with our system.

## 2023-08-08 DIAGNOSIS — I50.20 UNSPECIFIED SYSTOLIC (CONGESTIVE) HEART FAILURE: ICD-10-CM

## 2023-08-08 DIAGNOSIS — Z99.81 DEPENDENCE ON SUPPLEMENTAL OXYGEN: ICD-10-CM

## 2023-08-08 DIAGNOSIS — J47.9 BRONCHIECTASIS, UNCOMPLICATED: ICD-10-CM

## 2023-08-08 DIAGNOSIS — Z87.891 PERSONAL HISTORY OF NICOTINE DEPENDENCE: ICD-10-CM

## 2023-08-08 DIAGNOSIS — R91.8 OTHER NONSPECIFIC ABNORMAL FINDING OF LUNG FIELD: ICD-10-CM

## 2023-08-08 DIAGNOSIS — I25.10 ATHEROSCLEROTIC HEART DISEASE OF NATIVE CORONARY ARTERY WITHOUT ANGINA PECTORIS: ICD-10-CM

## 2023-08-08 DIAGNOSIS — Z95.810 PRESENCE OF AUTOMATIC (IMPLANTABLE) CARDIAC DEFIBRILLATOR: ICD-10-CM

## 2023-08-08 DIAGNOSIS — J96.01 ACUTE RESPIRATORY FAILURE WITH HYPOXIA: ICD-10-CM

## 2023-08-08 DIAGNOSIS — Z95.5 PRESENCE OF CORONARY ANGIOPLASTY IMPLANT AND GRAFT: ICD-10-CM

## 2023-08-08 DIAGNOSIS — Z79.82 LONG TERM (CURRENT) USE OF ASPIRIN: ICD-10-CM

## 2023-08-08 DIAGNOSIS — Z79.51 LONG TERM (CURRENT) USE OF INHALED STEROIDS: ICD-10-CM

## 2023-08-08 DIAGNOSIS — J45.901 UNSPECIFIED ASTHMA WITH (ACUTE) EXACERBATION: ICD-10-CM

## 2023-08-08 DIAGNOSIS — J44.1 CHRONIC OBSTRUCTIVE PULMONARY DISEASE WITH (ACUTE) EXACERBATION: ICD-10-CM

## 2023-08-08 DIAGNOSIS — J43.9 EMPHYSEMA, UNSPECIFIED: ICD-10-CM

## 2024-07-19 NOTE — DISCHARGE NOTE PROVIDER - NSDCHHPTASSISTHOME_GEN_ALL_CORE
----- Message from Piper Montes sent at 7/19/2024  8:02 AM CDT -----  Regarding: rx refill  RX Name:gabapentin (NEURONTIN) 300 MG capsule     How is it taken:Sig - Route: Take 1 capsule (300 mg total) by mouth 2 (two) times daily. - Oral     Quantity:60 capsules       Preferred Pharmacy with phone number:  [id number] - [place], [place] - [address] [facility] AT [facility] [address]  [address] [facility] [address]  Phone: [phone] Fax: [phone]          Ordering Provider:Olivia       Contact Preference:[phone] (home)       Addl info:Patient has enough for two days   Patient Needs Assistance to Leave Residence...
